# Patient Record
Sex: FEMALE | Race: BLACK OR AFRICAN AMERICAN | NOT HISPANIC OR LATINO | Employment: UNEMPLOYED | URBAN - METROPOLITAN AREA
[De-identification: names, ages, dates, MRNs, and addresses within clinical notes are randomized per-mention and may not be internally consistent; named-entity substitution may affect disease eponyms.]

---

## 2021-01-15 ENCOUNTER — HISTORICAL (OUTPATIENT)
Dept: ADMINISTRATIVE | Facility: HOSPITAL | Age: 31
End: 2021-01-15

## 2021-01-15 LAB
ABS NEUT (OLG): 2.18 X10(3)/MCL (ref 2.1–9.2)
ALBUMIN SERPL-MCNC: 4 GM/DL (ref 3.5–5)
ALBUMIN/GLOB SERPL: 0.9 RATIO (ref 1.1–2)
ALP SERPL-CCNC: 69 UNIT/L (ref 40–150)
ALT SERPL-CCNC: 14 UNIT/L (ref 0–55)
AST SERPL-CCNC: 21 UNIT/L (ref 5–34)
BASOPHILS # BLD AUTO: 0.1 X10(3)/MCL (ref 0–0.2)
BASOPHILS NFR BLD AUTO: 1 %
BILIRUB SERPL-MCNC: 0.2 MG/DL
BILIRUBIN DIRECT+TOT PNL SERPL-MCNC: 0.1 MG/DL (ref 0–0.5)
BILIRUBIN DIRECT+TOT PNL SERPL-MCNC: 0.1 MG/DL (ref 0–0.8)
BUN SERPL-MCNC: 8 MG/DL (ref 7–18.7)
CALCIUM SERPL-MCNC: 9 MG/DL (ref 8.4–10.2)
CHLORIDE SERPL-SCNC: 107 MMOL/L (ref 98–107)
CHOLEST SERPL-MCNC: 160 MG/DL
CHOLEST/HDLC SERPL: 3 {RATIO} (ref 0–5)
CO2 SERPL-SCNC: 25 MMOL/L (ref 22–29)
CREAT SERPL-MCNC: 0.83 MG/DL (ref 0.55–1.02)
EOSINOPHIL # BLD AUTO: 0.1 X10(3)/MCL (ref 0–0.9)
EOSINOPHIL NFR BLD AUTO: 1 %
ERYTHROCYTE [DISTWIDTH] IN BLOOD BY AUTOMATED COUNT: 18.7 % (ref 11.5–14.5)
EST. AVERAGE GLUCOSE BLD GHB EST-MCNC: 119.8 MG/DL
GLOBULIN SER-MCNC: 4.4 GM/DL (ref 2.4–3.5)
GLUCOSE SERPL-MCNC: 83 MG/DL (ref 74–100)
HBA1C MFR BLD: 5.8 %
HCT VFR BLD AUTO: 33.9 % (ref 35–46)
HDLC SERPL-MCNC: 62 MG/DL (ref 35–60)
HGB BLD-MCNC: 9.8 GM/DL (ref 12–16)
HYPOCHROMIA BLD QL SMEAR: NORMAL
IMM GRANULOCYTES # BLD AUTO: 0.01 10*3/UL
IMM GRANULOCYTES NFR BLD AUTO: 0 %
LDLC SERPL CALC-MCNC: 84 MG/DL (ref 50–140)
LYMPHOCYTES # BLD AUTO: 2.5 X10(3)/MCL (ref 0.6–4.6)
LYMPHOCYTES NFR BLD AUTO: 48 %
MCH RBC QN AUTO: 19.2 PG (ref 26–34)
MCHC RBC AUTO-ENTMCNC: 28.9 GM/DL (ref 31–37)
MCV RBC AUTO: 66.5 FL (ref 80–100)
MONOCYTES # BLD AUTO: 0.3 X10(3)/MCL (ref 0.1–1.3)
MONOCYTES NFR BLD AUTO: 7 %
NEUTROPHILS # BLD AUTO: 2.18 X10(3)/MCL (ref 2.1–9.2)
NEUTROPHILS NFR BLD AUTO: 42 %
PLATELET # BLD AUTO: 418 X10(3)/MCL (ref 130–400)
PLATELET # BLD EST: ADEQUATE 10*3/UL
PMV BLD AUTO: 10.1 FL (ref 7.4–10.4)
POTASSIUM SERPL-SCNC: 3.9 MMOL/L (ref 3.5–5.1)
PROT SERPL-MCNC: 8.4 GM/DL (ref 6.4–8.3)
RBC # BLD AUTO: 5.1 X10(6)/MCL (ref 4–5.2)
RBC MORPH BLD: NORMAL
SODIUM SERPL-SCNC: 141 MMOL/L (ref 136–145)
TRIGL SERPL-MCNC: 72 MG/DL (ref 37–140)
TSH SERPL-ACNC: 1.06 UIU/ML (ref 0.35–4.94)
VLDLC SERPL CALC-MCNC: 14 MG/DL
WBC # SPEC AUTO: 5.1 X10(3)/MCL (ref 4.5–11)

## 2021-11-30 ENCOUNTER — HISTORICAL (OUTPATIENT)
Dept: ADMINISTRATIVE | Facility: HOSPITAL | Age: 31
End: 2021-11-30

## 2021-11-30 LAB
BILIRUB SERPL-MCNC: NEGATIVE MG/DL
BLOOD URINE, POC: NEGATIVE
CLARITY, POC UA: NORMAL
COLOR, POC UA: YELLOW
GLUCOSE UR QL STRIP: NEGATIVE
KETONES UR QL STRIP: NEGATIVE
LEUKOCYTE EST, POC UA: NORMAL
NITRITE, POC UA: NEGATIVE
PH, POC UA: 7.5
POC BETA-HCG (QUAL): NEGATIVE
PROTEIN, POC: NEGATIVE
SPECIFIC GRAVITY, POC UA: 1.02
UROBILINOGEN, POC UA: NORMAL

## 2021-12-02 LAB — FINAL CULTURE: NORMAL

## 2022-04-11 ENCOUNTER — HISTORICAL (OUTPATIENT)
Dept: ADMINISTRATIVE | Facility: HOSPITAL | Age: 32
End: 2022-04-11
Payer: MEDICAID

## 2022-04-29 VITALS
OXYGEN SATURATION: 100 % | SYSTOLIC BLOOD PRESSURE: 101 MMHG | DIASTOLIC BLOOD PRESSURE: 70 MMHG | WEIGHT: 184.94 LBS | HEIGHT: 66 IN | WEIGHT: 177.94 LBS | SYSTOLIC BLOOD PRESSURE: 113 MMHG | BODY MASS INDEX: 28.6 KG/M2 | OXYGEN SATURATION: 100 % | BODY MASS INDEX: 29.72 KG/M2 | HEIGHT: 66 IN | DIASTOLIC BLOOD PRESSURE: 79 MMHG

## 2022-05-05 NOTE — HISTORICAL [facility] CERNER
This is a historical note converted from [employer]. Formatting and pictures may have been removed.  Please reference [employer] for original formatting and attached multimedia. Chief Complaint  burning with urination and wants to check for yeast infection  History of Present Illness  burning with urination and wants to check for yeast infection.  [facility] has seen Dr. Isha Freire in the past.  [facility] she gets frequent vaginal infections such as yeast or BV.  always after her cycle.   has never taken oral medicine for BV, was given a cream.  took OTC boric acid vaginal suppositories.  not diabetic,  was tested last year.  Review of Systems  Constitutional:?negative except as stated in HPI  Eye:?negative except as stated in HPI  ENMT:?negative except as stated in HPI  Respiratory:?negative except as stated in HPI  Cardiovascular:?negative except as stated in HPI  Gastrointestinal:?negative except as stated in HPI  Genitourinary:?negative except as stated in HPI  Hema/Lymph:?negative except as stated in HPI  Endocrine:?negative except as stated in HPI  Musculoskeletal:?negative except as stated in HPI  Integumentary:?negative except as stated in HPI  Neurologic: negative except as stated in HPI  Physical Exam  Vitals & Measurements  T:?37.2? ?C (Oral)? HR:?91(Peripheral)? RR:?16? BP:?101/70? SpO2:?100%?  HT:?168.00?cm? WT:?83.900?kg? BMI:?29.73? LMP:?11/11/2021 00:00 CST?  Chaperone present.?Maine LEYVAN  ?  Constitutional:? well today.  Psychiatric: ?Orientated to time, place, person, and situation. Normal mood and affect. Ambulatory, alert.?tolerated pelvic/speculum exam well.  Female Genitalia:  ?? Vulva: no masses, atrophy or lesions. superficial maceration; gritty, white to yellow discharge in the skin folds.  ?? Bladder/Urethra: no urethral discharge or visualized?mass, normal appearing meatus, bladder non-distended.  ?? Vagina:+vaginal discharge,?white,?moderatecottage cheese consistency; +tenderness,  erythema, no vesicle(s) or ulcers;?no odor.?no foreign objects.?????????????  ?? Cervix:?no discharge or cervical motion tenderness.  ?? Adnexa:?bimanual exam deferred  ?  ?  Assessment/Plan  1.?Cloudy urine?R82.90  ?urine dip appears to be contamination, no concern for UTI today.  culture pending due to symptoms.  Ordered:  Office/Outpatient Visit Level 4 Established [id number] PC, Vaginal discharge  Cloudy urine, 11/30/21 19:39:00 CST  Urine Culture [id number], Routine collect, 11/30/21 19:21:00 CST, Urine, Nurse collect, Stop date 11/30/21 19:21:00 CST, Cloudy urine  ?  2.?Vaginal yeast infection?B37.3  ?treat with diflucan.  discussed decrease or eliminate sugar in diet. drink more water. no tub baths/bubble baths. no scented pads/tampons. no douching. make PCP appt to be retested for DM. start a vaginal pH probiotic OTC. always cotton underwear. if sweating due to exercise, work etc, change out of wet clothes immediately at the end of activity.  Ordered:  nystatin topical, 1 ralph, TOP, TID, external use only, X 14 day(s), # 30 gm, 1 Refill(s), Pharmacy: [facility]/pharmacy #[id number], 168, cm, Height/Length Dosing, 11/30/21 18:31:00 CST, 83.9, kg, Weight Dosing, 11/30/21 18:31:00 CST  ?  3.?Vaginal discharge?N89.8,?Vaginal discharge?N89.8  ?discussed females more prone to BV after menstrual cycles, but if patient has never taken metronidazole, she would need that if +BV on swab today. start a vaginal pH probiotic.  Ordered:  Office/Outpatient Visit Level 4 Established [id number] PC, Vaginal discharge  Cloudy urine, 11/30/21 19:39:00 CST  Wet Prep Smear, Stat collect, Vaginal, Collected, 11/30/21 19:36:00 CST, Stop date 11/30/21 19:36:00 CST, Nurse collect, Vaginal discharge, 11/30/21 19:36:00 CST  ?   Problem List/Past Medical History  Ongoing  No chronic problems  Historical  No qualifying data  Procedure/Surgical History  Ultrasound, transvaginal (03/04/2021)  denies   Medications  Colace 100 mg oral capsule, 100 mg= 1 cap(s), Oral,  BID, PRN, 5 refills,? ?Not Taking, Completed Rx  nystatin 100,000 units/g topical cream, 1 ralph, TOP, TID, 1 refills  Allergies  No Known Allergies  Social History  Abuse/Neglect  No, No, Yes, 11/30/2021  Alcohol - Low Risk, 06/22/2014  Current, 1-2 times per month, 01/15/2021  Employment/School  Unemployed, 01/15/2021  Exercise  Home/Environment  Lives with Alone., 01/15/2021  Nutrition/Health  Regular, 01/15/2021  Sexual  Sexually active: Yes. Gender Identity Identifies as female., 01/15/2021  Substance Use  Never, 01/15/2021  Tobacco - Denies Tobacco Use, 06/22/2014  Never (less than 100 in lifetime), N/A, 11/30/2021  Immunizations  Vaccine Date Status   COVID-19, vector nr, [id number] - Tashi 07/07/2021 Given   tetanus-diphtheria toxoids 05/20/2021 Given   Health Maintenance  Health Maintenance  ???Pending?(in the next year)  ??? ??OverDue  ??? ? ? ?Alcohol Misuse Screening due??01/02/21??and every 1??year(s)  ??? ??Due?  ??? ? ? ?Cervical Cancer Screening due??11/30/21??Unknown Frequency  ??? ??Due In Future?  ??? ? ? ?Obesity Screening not due until??01/01/22??and every 1??year(s)  ??? ? ? ?Depression Screening not due until??01/15/22??and every 1??year(s)  ??? ? ? ?Diabetes Screening not due until??01/15/22??and every 1??year(s)  ??? ? ? ?ADL Screening not due until??01/15/22??and every 1??year(s)  ???Satisfied?(in the past 1 year)  ??? ??Satisfied?  ??? ? ? ?ADL Screening on??01/15/21.??Satisfied by Gladys Chappell  ??? ? ? ?Blood Pressure Screening on??11/30/21.??Satisfied by Maine Valerio LPN  ??? ? ? ?Body Mass Index Check on??11/30/21.??Satisfied by Maine Valerio LPN  ??? ? ? ?Depression Screening on??01/15/21.??Satisfied by Gladys Chappell  ??? ? ? ?Diabetes Screening on??01/15/21.??Satisfied by Za Peralta  ??? ? ? ?Influenza Vaccine on??11/30/21.??Satisfied by Maine Valerio LPN  ??? ? ? ?Obesity Screening on??11/30/21.??Satisfied by Maine Valerio LPN  ??? ? ?  ?Tetanus Vaccine on??05/20/21.??Satisfied by David Kumar LPN  ?  Lab Results  Test Name Test Result Date/Time   Urine Color Urine Dipstick Yellow 11/30/2021 18:21 CST   Urine Appearance Urine Dipstick Cloudy 11/30/2021 18:21 CST   pH Urine Dipstick 7.5 11/30/2021 18:21 CST   Specific Gravity Urine Dipstick 1.020 11/30/2021 18:21 CST   Blood Urine Dipstick Negative 11/30/2021 18:21 CST   Glucose Urine Dipstick Negative 11/30/2021 18:21 CST   Ketones Urine Dipstick Negative 11/30/2021 18:21 CST   Protein Urine Dipstick Negative 11/30/2021 18:21 CST   Bilirubin Urine Dipstick Negative 11/30/2021 18:21 CST   Urobilinogen Urine Dipstick 0.2 mg/dl 11/30/2021 18:21 CST   Leukocytes Urine Dipstick Trace 11/30/2021 18:21 CST   Nitrite Urine Dipstick Negative 11/30/2021 18:21 CST   U beta hCG Ql POC Negative 11/30/2021 18:39 CST   Diagnostic Results  pending

## 2022-07-28 ENCOUNTER — OFFICE VISIT (OUTPATIENT)
Dept: INTERNAL MEDICINE | Facility: CLINIC | Age: 32
End: 2022-07-28
Payer: MEDICAID

## 2022-07-28 ENCOUNTER — PATIENT MESSAGE (OUTPATIENT)
Dept: INTERNAL MEDICINE | Facility: CLINIC | Age: 32
End: 2022-07-28

## 2022-07-28 VITALS
SYSTOLIC BLOOD PRESSURE: 100 MMHG | DIASTOLIC BLOOD PRESSURE: 70 MMHG | OXYGEN SATURATION: 100 % | WEIGHT: 176 LBS | HEART RATE: 95 BPM | HEIGHT: 66 IN | TEMPERATURE: 98 F | BODY MASS INDEX: 28.28 KG/M2 | RESPIRATION RATE: 18 BRPM

## 2022-07-28 DIAGNOSIS — K59.00 CONSTIPATION, UNSPECIFIED CONSTIPATION TYPE: ICD-10-CM

## 2022-07-28 DIAGNOSIS — Z13.21 ENCOUNTER FOR VITAMIN DEFICIENCY SCREENING: ICD-10-CM

## 2022-07-28 DIAGNOSIS — Z13.0 SCREENING FOR IRON DEFICIENCY ANEMIA: ICD-10-CM

## 2022-07-28 DIAGNOSIS — Z11.59 SCREENING FOR VIRAL DISEASE: ICD-10-CM

## 2022-07-28 DIAGNOSIS — Z11.3 ROUTINE SCREENING FOR STI (SEXUALLY TRANSMITTED INFECTION): ICD-10-CM

## 2022-07-28 DIAGNOSIS — Z13.220 SCREENING FOR LIPID DISORDERS: ICD-10-CM

## 2022-07-28 DIAGNOSIS — Z11.4 SCREENING FOR HIV (HUMAN IMMUNODEFICIENCY VIRUS): ICD-10-CM

## 2022-07-28 DIAGNOSIS — G43.909 MIGRAINE WITHOUT STATUS MIGRAINOSUS, NOT INTRACTABLE, UNSPECIFIED MIGRAINE TYPE: ICD-10-CM

## 2022-07-28 DIAGNOSIS — Z13.29 SCREENING FOR THYROID DISORDER: ICD-10-CM

## 2022-07-28 DIAGNOSIS — Z13.1 SCREENING FOR DIABETES MELLITUS: ICD-10-CM

## 2022-07-28 DIAGNOSIS — N97.9 INFERTILITY, FEMALE: Primary | ICD-10-CM

## 2022-07-28 PROCEDURE — 1159F PR MEDICATION LIST DOCUMENTED IN MEDICAL RECORD: ICD-10-PCS | Mod: CPTII,,,

## 2022-07-28 PROCEDURE — 3078F PR MOST RECENT DIASTOLIC BLOOD PRESSURE < 80 MM HG: ICD-10-PCS | Mod: CPTII,,,

## 2022-07-28 PROCEDURE — 1160F PR REVIEW ALL MEDS BY PRESCRIBER/CLIN PHARMACIST DOCUMENTED: ICD-10-PCS | Mod: CPTII,,,

## 2022-07-28 PROCEDURE — 3008F BODY MASS INDEX DOCD: CPT | Mod: CPTII,,,

## 2022-07-28 PROCEDURE — 99214 PR OFFICE/OUTPT VISIT, EST, LEVL IV, 30-39 MIN: ICD-10-PCS | Mod: S$PBB,,,

## 2022-07-28 PROCEDURE — 3008F PR BODY MASS INDEX (BMI) DOCUMENTED: ICD-10-PCS | Mod: CPTII,,,

## 2022-07-28 PROCEDURE — 99214 OFFICE O/P EST MOD 30 MIN: CPT | Mod: S$PBB,,,

## 2022-07-28 PROCEDURE — 3074F PR MOST RECENT SYSTOLIC BLOOD PRESSURE < 130 MM HG: ICD-10-PCS | Mod: CPTII,,,

## 2022-07-28 PROCEDURE — 3078F DIAST BP <80 MM HG: CPT | Mod: CPTII,,,

## 2022-07-28 PROCEDURE — 1160F RVW MEDS BY RX/DR IN RCRD: CPT | Mod: CPTII,,,

## 2022-07-28 PROCEDURE — 3074F SYST BP LT 130 MM HG: CPT | Mod: CPTII,,,

## 2022-07-28 PROCEDURE — 99215 OFFICE O/P EST HI 40 MIN: CPT | Mod: PBBFAC

## 2022-07-28 PROCEDURE — 1159F MED LIST DOCD IN RCRD: CPT | Mod: CPTII,,,

## 2022-07-28 RX ORDER — SUMATRIPTAN 50 MG/1
50 TABLET, FILM COATED ORAL EVERY 6 HOURS PRN
Qty: 30 TABLET | Refills: 1 | Status: SHIPPED | OUTPATIENT
Start: 2022-07-28 | End: 2023-01-18

## 2022-07-28 NOTE — ASSESSMENT & PLAN NOTE
Rx sumatriptan.  Avoid triggers such as bright lights, alcohol, nicotine, aged cheeses, chocolate, caffeine, foods/drinks that contain nitrates or aspartame.   Take meds as prescribed.   Lie in a quiet, dark room if possible.   Limit stress and get at least 8 hours of sleep per night.

## 2022-07-28 NOTE — ASSESSMENT & PLAN NOTE
Referral to [facility] GYN clinic.  LH, FH, and testosterone ordered, will refer to Endocrine if any abnormalities.

## 2022-07-28 NOTE — PROGRESS NOTES
Subjective:       Patient ID: Dafne Traore is a 32 y.o. female.    Chief Complaint: Establish Care, Constipation, Headache, and Infertility    HPI   Dafne Traore is a 32 y.o. Black female presenting in clinic today to Establish Care, Constipation, Headache, and Infertility. Previous PCP: Dr. Isha Freire. No significant medical history.     Patient states she is trying to conceive and has been unsuccessful. She says she does not have any children, but her spouse has children. She says her menstrual cycle is monthly, but it does not come on the same date each month as she used to. She thinks she may have PCOS. She says she does have hair that grows on her chin.     She is also complaining of constipation. She experiences intermittent LLQ pain. She says he will have a bowel movement once a week. Last bowel movement last night. She endorses flatus. She denies blood in stool or dark, tarry stool. She has tried miralax previously.     She is complaining of a left frontal HA radiates to back of head. Quality is throbbing. She says it occurs approximately ;associated L hemiplegia. She says it occurs daily, but then sometimes 3xs per week that lasts a few hours when it occurs. HA associated with photophobia and phonophobia and at times nausea. Declines an aura. She takes tylenol prn without relief.    Denies smoking, drinking, or illicit drug use. Denies chest pain, shortness of breath, cough, headache, dizziness, weakness, abdominal pain, nausea, vomiting, diarrhea, constipation, dysuria, depression, anxiety, SI, and HI.      Cervical Cancer Screening - Last Pap on 2021 at [facility]. Follow up with GYN Clinic for annual Pap/Pelvic.  Breast Cancer Screening -Deferred due to age.   Colon Cancer Screening - Deferred due to age.   Osteoporosis Screening -Deferred due to age.   Vaccinations: Flu - Not currently being offered. / Tetanus - 5/20/2021 /COVID - 7/7/2021 ([facility]).         Review of Systems   Constitutional:  Negative.    HENT: Negative.    Eyes: Negative.    Respiratory: Negative.    Cardiovascular: Negative.    Gastrointestinal: Positive for constipation.   Endocrine: Negative.    Genitourinary: Positive for menstrual problem.   Musculoskeletal: Negative.    Integumentary:  Negative.   Allergic/Immunologic: Negative.    Neurological: Positive for headaches.   Hematological: Negative.    Psychiatric/Behavioral: Negative.    All other systems reviewed and are negative.        Objective:      Physical Exam  Constitutional:       Appearance: Normal appearance. She is normal weight.   HENT:      Head: Normocephalic and atraumatic.      Right Ear: External ear normal.      Left Ear: External ear normal.      Nose: Nose normal.      Mouth/Throat:      Mouth: Mucous membranes are moist.      Pharynx: Oropharynx is clear.   Eyes:      Extraocular Movements: Extraocular movements intact.      Conjunctiva/sclera: Conjunctivae normal.      Pupils: Pupils are equal, round, and reactive to light.   Cardiovascular:      Rate and Rhythm: Normal rate and regular rhythm.      Pulses: Normal pulses.      Heart sounds: Normal heart sounds.   Pulmonary:      Effort: Pulmonary effort is normal.      Breath sounds: Normal breath sounds.   Abdominal:      General: Bowel sounds are normal.      Palpations: Abdomen is soft.   Musculoskeletal:         General: Normal range of motion.      Cervical back: Normal range of motion and neck supple.   Skin:     General: Skin is warm and dry.      Capillary Refill: Capillary refill takes less than 2 seconds.   Neurological:      General: No focal deficit present.      Mental Status: She is alert and oriented to person, place, and time.   Psychiatric:         Mood and Affect: Mood normal.         Behavior: Behavior normal.         Thought Content: Thought content normal.         Judgment: Judgment normal.         Assessment and Plan:       Problem List Items Addressed This Visit        Neuro    Migraine  without status migrainosus, not intractable     Rx sumatriptan.  Avoid triggers such as bright lights, alcohol, nicotine, aged cheeses, chocolate, caffeine, foods/drinks that contain nitrates or aspartame.   Take meds as prescribed.   Lie in a quiet, dark room if possible.   Limit stress and get at least 8 hours of sleep per night.              Relevant Medications    sumatriptan (IMITREX) 50 MG tablet       Renal/    Infertility, female - Primary     Referral to [facility] GYN clinic.  LH, FH, and testosterone ordered, will refer to Endocrine if any abnormalities.           Relevant Orders    Ambulatory referral/consult to Gynecology    CBC Auto Differential    Comprehensive Metabolic Panel    Microalbumin/Creatinine Ratio, Urine    Urinalysis, Reflex to Urine Culture Urine, Clean Catch    Testosterone    Follicle Stimulating Hormone    Luteinizing Hormone       Endocrine    BMI 28.0-28.9,adult     Body mass index is 28.41 kg/m². (At goal).                GI    Constipation     Rx linzess.  Educated on high fiber diet and exercise.  Drink at least 64 ozs of water daily.  Eat hot breakfast q am.             Relevant Medications    linaCLOtide (LINZESS) 72 mcg Cap capsule      Other Visit Diagnoses     Screening for thyroid disorder        Relevant Orders    T4, Free    TSH    Encounter for vitamin deficiency screening        Relevant Orders    Vitamin D    Routine screening for STI (sexually transmitted infection)        Relevant Orders    Chlamydia/GC, PCR    SYPHILIS ANTIBODY (WITH REFLEX RPR)    Screening for diabetes mellitus        Relevant Orders    Hemoglobin A1C    Screening for iron deficiency anemia        Relevant Orders    Ferritin    Iron and TIBC    Screening for lipid disorders        Relevant Orders    Lipid Panel    Screening for viral disease        Relevant Orders    Hepatitis Panel, Acute    Screening for HIV (human immunodeficiency virus)        Relevant Orders    HIV 1/2 Ag/Ab (4th Gen)             Audio only visit in one week.  Labs prior to appt.  RTC prn.      Daniella Lopez, JAH  07/28/2022

## 2022-07-28 NOTE — ASSESSMENT & PLAN NOTE
Rx linzess.  Educated on high fiber diet and exercise.  Drink at least 64 ozs of water daily.  Eat hot breakfast q am.

## 2022-07-29 ENCOUNTER — APPOINTMENT (OUTPATIENT)
Dept: LAB | Facility: HOSPITAL | Age: 32
End: 2022-07-29
Payer: MEDICAID

## 2022-07-29 DIAGNOSIS — Z13.220 SCREENING FOR LIPID DISORDERS: ICD-10-CM

## 2022-07-29 DIAGNOSIS — Z13.21 ENCOUNTER FOR VITAMIN DEFICIENCY SCREENING: ICD-10-CM

## 2022-07-29 DIAGNOSIS — Z13.0 SCREENING FOR IRON DEFICIENCY ANEMIA: ICD-10-CM

## 2022-07-29 DIAGNOSIS — N97.9 INFERTILITY, FEMALE: ICD-10-CM

## 2022-07-29 DIAGNOSIS — Z11.4 SCREENING FOR HIV (HUMAN IMMUNODEFICIENCY VIRUS): ICD-10-CM

## 2022-07-29 DIAGNOSIS — Z11.59 SCREENING FOR VIRAL DISEASE: ICD-10-CM

## 2022-07-29 DIAGNOSIS — Z13.29 SCREENING FOR THYROID DISORDER: ICD-10-CM

## 2022-07-29 DIAGNOSIS — Z13.1 SCREENING FOR DIABETES MELLITUS: ICD-10-CM

## 2022-07-29 DIAGNOSIS — Z11.3 ROUTINE SCREENING FOR STI (SEXUALLY TRANSMITTED INFECTION): ICD-10-CM

## 2022-07-29 LAB
ALBUMIN SERPL-MCNC: 4 GM/DL (ref 3.5–5)
ALBUMIN/GLOB SERPL: 0.9 RATIO (ref 1.1–2)
ALP SERPL-CCNC: 68 UNIT/L (ref 40–150)
ALT SERPL-CCNC: 23 UNIT/L (ref 0–55)
APPEARANCE UR: CLEAR
AST SERPL-CCNC: 23 UNIT/L (ref 5–34)
BACTERIA #/AREA URNS AUTO: ABNORMAL /HPF
BASOPHILS # BLD AUTO: 0.06 X10(3)/MCL (ref 0–0.2)
BASOPHILS NFR BLD AUTO: 1.3 %
BILIRUB UR QL STRIP.AUTO: NEGATIVE MG/DL
BILIRUBIN DIRECT+TOT PNL SERPL-MCNC: 0.3 MG/DL
BUN SERPL-MCNC: 8.3 MG/DL (ref 7–18.7)
C TRACH DNA SPEC QL NAA+PROBE: NOT DETECTED
CALCIUM SERPL-MCNC: 9.5 MG/DL (ref 8.4–10.2)
CHLORIDE SERPL-SCNC: 105 MMOL/L (ref 98–107)
CHOLEST SERPL-MCNC: 175 MG/DL
CHOLEST/HDLC SERPL: 2 {RATIO} (ref 0–5)
CO2 SERPL-SCNC: 26 MMOL/L (ref 22–29)
COLOR UR AUTO: ABNORMAL
CREAT SERPL-MCNC: 0.8 MG/DL (ref 0.55–1.02)
CREAT UR-MCNC: 192.2 MG/DL (ref 47–110)
DEPRECATED CALCIDIOL+CALCIFEROL SERPL-MC: 21.8 NG/ML (ref 30–80)
EOSINOPHIL # BLD AUTO: 0.05 X10(3)/MCL (ref 0–0.9)
EOSINOPHIL NFR BLD AUTO: 1.1 %
ERYTHROCYTE [DISTWIDTH] IN BLOOD BY AUTOMATED COUNT: 18.6 % (ref 11.5–17)
EST. AVERAGE GLUCOSE BLD GHB EST-MCNC: 114 MG/DL
FERRITIN SERPL-MCNC: 6.71 NG/ML (ref 4.63–204)
FSH SERPL-ACNC: 7.72 MIU/ML
GLOBULIN SER-MCNC: 4.5 GM/DL (ref 2.4–3.5)
GLUCOSE SERPL-MCNC: 88 MG/DL (ref 74–100)
GLUCOSE UR QL STRIP.AUTO: NORMAL MG/DL
HAV IGM SERPL QL IA: NONREACTIVE
HBA1C MFR BLD: 5.6 %
HBV CORE IGM SERPL QL IA: NONREACTIVE
HBV SURFACE AG SERPL QL IA: NONREACTIVE
HCT VFR BLD AUTO: 35.7 % (ref 37–47)
HCV AB SERPL QL IA: NONREACTIVE
HDLC SERPL-MCNC: 75 MG/DL (ref 35–60)
HGB BLD-MCNC: 10.6 GM/DL (ref 12–16)
HIV 1+2 AB+HIV1 P24 AG SERPL QL IA: NONREACTIVE
HYALINE CASTS #/AREA URNS LPF: ABNORMAL /LPF
IMM GRANULOCYTES # BLD AUTO: 0.01 X10(3)/MCL (ref 0–0.04)
IMM GRANULOCYTES NFR BLD AUTO: 0.2 %
IRON SATN MFR SERPL: 5 % (ref 20–50)
IRON SERPL-MCNC: 22 UG/DL (ref 50–170)
KETONES UR QL STRIP.AUTO: NEGATIVE MG/DL
LDLC SERPL CALC-MCNC: 91 MG/DL (ref 50–140)
LEUKOCYTE ESTERASE UR QL STRIP.AUTO: NEGATIVE UNIT/L
LH SERPL-ACNC: 6.5 MIU/ML
LYMPHOCYTES # BLD AUTO: 1.99 X10(3)/MCL (ref 0.6–4.6)
LYMPHOCYTES NFR BLD AUTO: 42.3 %
MCH RBC QN AUTO: 20 PG (ref 27–31)
MCHC RBC AUTO-ENTMCNC: 29.7 MG/DL (ref 33–36)
MCV RBC AUTO: 67.5 FL (ref 80–94)
MICROALBUMIN UR-MCNC: 16.2 UG/ML
MICROALBUMIN/CREAT RATIO PNL UR: 8.4 MG/GM CR (ref 0–30)
MONOCYTES # BLD AUTO: 0.28 X10(3)/MCL (ref 0.1–1.3)
MONOCYTES NFR BLD AUTO: 5.9 %
MUCOUS THREADS URNS QL MICRO: ABNORMAL /LPF
N GONORRHOEA DNA SPEC QL NAA+PROBE: NOT DETECTED
NEUTROPHILS # BLD AUTO: 2.3 X10(3)/MCL (ref 2.1–9.2)
NEUTROPHILS NFR BLD AUTO: 49.2 %
NITRITE UR QL STRIP.AUTO: NEGATIVE
NRBC BLD AUTO-RTO: 0 %
PH UR STRIP.AUTO: 5.5 [PH]
PLATELET # BLD AUTO: 380 X10(3)/MCL (ref 130–400)
PMV BLD AUTO: 9.7 FL (ref 7.4–10.4)
POTASSIUM SERPL-SCNC: 3.9 MMOL/L (ref 3.5–5.1)
PROT SERPL-MCNC: 8.5 GM/DL (ref 6.4–8.3)
PROT UR QL STRIP.AUTO: NEGATIVE MG/DL
RBC # BLD AUTO: 5.29 X10(6)/MCL (ref 4.2–5.4)
RBC #/AREA URNS AUTO: ABNORMAL /HPF
RBC UR QL AUTO: NEGATIVE UNIT/L
SODIUM SERPL-SCNC: 139 MMOL/L (ref 136–145)
SP GR UR STRIP.AUTO: 1.02
SQUAMOUS #/AREA URNS LPF: ABNORMAL /HPF
T PALLIDUM AB SER QL: REACTIVE
T4 FREE SERPL-MCNC: 0.88 NG/DL (ref 0.7–1.48)
TESTOST SERPL-MCNC: 34.31 NG/DL (ref 13.84–53.35)
TIBC SERPL-MCNC: 400 UG/DL (ref 70–310)
TIBC SERPL-MCNC: 422 UG/DL (ref 250–450)
TRANSFERRIN SERPL-MCNC: 385 MG/DL (ref 180–382)
TRIGL SERPL-MCNC: 43 MG/DL (ref 37–140)
TSH SERPL-ACNC: 0.78 UIU/ML (ref 0.35–4.94)
UROBILINOGEN UR STRIP-ACNC: NORMAL MG/DL
VLDLC SERPL CALC-MCNC: 9 MG/DL
WBC # SPEC AUTO: 4.7 X10(3)/MCL (ref 4.5–11.5)
WBC #/AREA URNS AUTO: ABNORMAL /HPF

## 2022-07-29 PROCEDURE — 83540 ASSAY OF IRON: CPT

## 2022-07-29 PROCEDURE — 86780 TREPONEMA PALLIDUM: CPT

## 2022-07-29 PROCEDURE — 80061 LIPID PANEL: CPT

## 2022-07-29 PROCEDURE — 84443 ASSAY THYROID STIM HORMONE: CPT

## 2022-07-29 PROCEDURE — 84403 ASSAY OF TOTAL TESTOSTERONE: CPT

## 2022-07-29 PROCEDURE — 83036 HEMOGLOBIN GLYCOSYLATED A1C: CPT

## 2022-07-29 PROCEDURE — 82306 VITAMIN D 25 HYDROXY: CPT

## 2022-07-29 PROCEDURE — 80074 ACUTE HEPATITIS PANEL: CPT

## 2022-07-29 PROCEDURE — 87491 CHLMYD TRACH DNA AMP PROBE: CPT

## 2022-07-29 PROCEDURE — 85025 COMPLETE CBC W/AUTO DIFF WBC: CPT

## 2022-07-29 PROCEDURE — 87389 HIV-1 AG W/HIV-1&-2 AB AG IA: CPT

## 2022-07-29 PROCEDURE — 82728 ASSAY OF FERRITIN: CPT

## 2022-07-29 PROCEDURE — 87591 N.GONORRHOEAE DNA AMP PROB: CPT

## 2022-07-29 PROCEDURE — 80053 COMPREHEN METABOLIC PANEL: CPT

## 2022-07-29 PROCEDURE — 82043 UR ALBUMIN QUANTITATIVE: CPT

## 2022-07-29 PROCEDURE — 86592 SYPHILIS TEST NON-TREP QUAL: CPT

## 2022-07-29 PROCEDURE — 83002 ASSAY OF GONADOTROPIN (LH): CPT

## 2022-07-29 PROCEDURE — 83001 ASSAY OF GONADOTROPIN (FSH): CPT

## 2022-07-29 PROCEDURE — 81001 URINALYSIS AUTO W/SCOPE: CPT

## 2022-07-29 PROCEDURE — 36415 COLL VENOUS BLD VENIPUNCTURE: CPT

## 2022-07-29 PROCEDURE — 84439 ASSAY OF FREE THYROXINE: CPT

## 2022-07-31 LAB
RPR SER QL: NORMAL
RPR SER QL: NORMAL
RPR SER-TITR: NORMAL {TITER}

## 2022-08-03 LAB — T PALLIDUM AB SER QL: REACTIVE

## 2022-08-04 ENCOUNTER — OFFICE VISIT (OUTPATIENT)
Dept: INTERNAL MEDICINE | Facility: CLINIC | Age: 32
End: 2022-08-04
Payer: MEDICAID

## 2022-08-04 DIAGNOSIS — D50.8 IRON DEFICIENCY ANEMIA SECONDARY TO INADEQUATE DIETARY IRON INTAKE: Primary | ICD-10-CM

## 2022-08-04 DIAGNOSIS — Z00.00 WELL ADULT EXAM: ICD-10-CM

## 2022-08-04 DIAGNOSIS — E55.9 VITAMIN D DEFICIENCY: ICD-10-CM

## 2022-08-04 DIAGNOSIS — L70.8 OTHER ACNE: ICD-10-CM

## 2022-08-04 DIAGNOSIS — L68.0 FEMALE HIRSUTISM: ICD-10-CM

## 2022-08-04 PROCEDURE — 99214 OFFICE O/P EST MOD 30 MIN: CPT | Mod: 95,,,

## 2022-08-04 PROCEDURE — 1160F RVW MEDS BY RX/DR IN RCRD: CPT | Mod: CPTII,95,,

## 2022-08-04 PROCEDURE — 1159F PR MEDICATION LIST DOCUMENTED IN MEDICAL RECORD: ICD-10-PCS | Mod: CPTII,95,,

## 2022-08-04 PROCEDURE — 99214 PR OFFICE/OUTPT VISIT, EST, LEVL IV, 30-39 MIN: ICD-10-PCS | Mod: 95,,,

## 2022-08-04 PROCEDURE — 1159F MED LIST DOCD IN RCRD: CPT | Mod: CPTII,95,,

## 2022-08-04 PROCEDURE — 1160F PR REVIEW ALL MEDS BY PRESCRIBER/CLIN PHARMACIST DOCUMENTED: ICD-10-PCS | Mod: CPTII,95,,

## 2022-08-04 RX ORDER — FERROUS SULFATE 325(65) MG
325 TABLET, DELAYED RELEASE (ENTERIC COATED) ORAL DAILY
Qty: 90 TABLET | Refills: 1 | Status: SHIPPED | OUTPATIENT
Start: 2022-08-04 | End: 2023-01-18

## 2022-08-04 RX ORDER — ASPIRIN 325 MG
50000 TABLET, DELAYED RELEASE (ENTERIC COATED) ORAL
Qty: 12 CAPSULE | Refills: 0 | Status: SHIPPED | OUTPATIENT
Start: 2022-08-04 | End: 2023-04-20

## 2022-08-04 NOTE — PROGRESS NOTES
PATIENT NAME: Dafne Traore  : 1990  DATE: 22  MRN: [id number]          Reason for Visit/Chief Complaint   Labs Only       History of Present Illness (HPI)     Audio Only Telehealth Visit     The patient location is: [place], [place]  The chief complaint leading to consultation is: Lab review  Visit type: Virtual visit with audio only (telephone)  Total time spent with patient: 20 minutes     The reason for the audio only service rather than synchronous audio and video virtual visit was related to technical difficulties or patient preference/necessity.     Each patient to whom I provide medical services by telemedicine is:  (1) informed of the relationship between the physician and patient and the respective role of any other health care provider with respect to management of the patient; and (2) notified that they may decline to receive medical services by telemedicine and may withdraw from such care at any time. Patient verbally consented to receive this service via voice-only telephone call.       Dafne Traore is a 32 y.o. Black female presenting via audio only for Labs Only.Patient states she is trying to conceive and has been unsuccessful. She says she does not have any children, but her spouse has children. She says her menstrual cycle is monthly, but it does not come on the same date each month as she used to. She thinks she may have PCOS. She says she does have hair that grows on her chin.       At last office visit, she complained of constipation. She was prescribed Linzess, she says she has been having regular bowel movements.     FSH, LH, and testosterone all WNL. Iron saturation-5. Vitamin D-21.8. Patient is not taking any supplements. She endorses fatigue.    Denies smoking, drinking, or illicit drug use. Denies chest pain, shortness of breath, cough, headache, dizziness, weakness, abdominal pain, nausea, vomiting, diarrhea, constipation, dysuria, depression, anxiety, SI, and  HI.        Cervical Cancer Screening - Last Pap on 2021 at [facility]. Follow up with GYN Clinic for annual Pap/Pelvic.  Breast Cancer Screening -Deferred due to age.   Colon Cancer Screening - Deferred due to age.   Osteoporosis Screening -Deferred due to age.   Vaccinations: Flu - Not currently being offered. / Tetanus - 5/20/2021 /COVID - 7/7/2021 ([facility]).       This service was not originating from a related E/M service provided within the previous 7 days nor will  to an E/M service or procedure within the next 24 hours or my soonest available appointment.  Prevailing standard of care was able to be met in this audio-only visit.        Review of Systems     Review of Systems   Constitutional: Negative.    HENT: Negative.    Eyes: Negative.    Respiratory: Negative.    Cardiovascular: Negative.    Gastrointestinal: Negative.    Endocrine: Negative.         Hair on chin.   Genitourinary: Negative.    Musculoskeletal: Negative.    Allergic/Immunologic: Negative.    Neurological: Negative.    Hematological: Negative.    Psychiatric/Behavioral: Negative.        Medical / Social / Family History   History reviewed. No pertinent past medical history.      History reviewed. No pertinent surgical history.      Social History  Dafne Zavala  reports that she has never smoked. She has never used smokeless tobacco. She reports current alcohol use. She reports that she does not use drugs.    Family History  Dafne Traore's family history includes Diabetes in her mother; Stroke in her father.    Medications and Allergies     Medications  Medication List with Changes/Refills   New Medications    CHOLECALCIFEROL, VITAMIN D3, 1,250 MCG (50,000 UNIT) CAPSULE    Take 1 capsule (50,000 Units total) by mouth every 7 days.    FERROUS SULFATE 325 (65 FE) MG EC TABLET    Take 1 tablet (325 mg total) by mouth once daily.   Current Medications    LINACLOTIDE (LINZESS) 72 MCG CAP CAPSULE    Take 1 capsule (72 mcg total) by mouth  before breakfast.    SUMATRIPTAN (IMITREX) 50 MG TABLET    Take 1 tablet (50 mg total) by mouth every 6 (six) hours as needed (headache).         Allergies  Review of patient's allergies indicates:  No Known Allergies    Physical Examination   There were no vitals filed for this visit.  Physical Exam  Pulmonary:      Effort: Pulmonary effort is normal.   Neurological:      General: No focal deficit present.      Mental Status: She is alert and oriented to person, place, and time.   Psychiatric:         Mood and Affect: Mood normal.         Behavior: Behavior normal.         Thought Content: Thought content normal.         Judgment: Judgment normal.           Results     Lab Results   Component Value Date    WBC 4.7 07/29/2022    RBC 5.29 07/29/2022    HGB 10.6 (L) 07/29/2022    HCT 35.7 (L) 07/29/2022    MCV 67.5 (L) 07/29/2022    MCH 20.0 (L) 07/29/2022    MCHC 29.7 (L) 07/29/2022    RDW 18.6 (H) 07/29/2022     07/29/2022    MPV 9.7 07/29/2022     Lab Results   Component Value Date     07/29/2022    K 3.9 07/29/2022    CO2 26 07/29/2022    BUN 8.3 07/29/2022    CREATININE 0.80 07/29/2022    CALCIUM 9.5 07/29/2022    ALBUMIN 4.0 07/29/2022    BILITOT 0.3 07/29/2022    ALKPHOS 68 07/29/2022    AST 23 07/29/2022    ALT 23 07/29/2022    EGFRIFAFRICA >60 07/29/2022    EGFRNONAA 86 (L) 01/15/2021     Lab Results   Component Value Date    TSH 0.7843 07/29/2022     Lab Results   Component Value Date    CHOL 175 07/29/2022    HDL 75 (H) 07/29/2022    LDL 91.00 07/29/2022    TRIG 43 07/29/2022     Lab Results   Component Value Date    APPEARANCEUA Clear 07/29/2022    LEUKOCYTESUR Negative 07/29/2022    RBCUA 0-5 07/29/2022    WBCUA 0-5 07/29/2022    BACTERIA None Seen 07/29/2022    HYALINECASTS None Seen 07/29/2022     Lab Results   Component Value Date    CREATRANDUR 192.2 (H) 07/29/2022     Lab Results   Component Value Date    [id number] 21.8 (L) 07/29/2022     Lab Results   Component Value Date    HIV  Nonreactive 07/29/2022    HEPAIGM Nonreactive 07/29/2022    HEPBCOREM Nonreactive 07/29/2022    HEPCAB Nonreactive 07/29/2022     No results found for: FITDIAG, COLOGUARD  No results found for: OCCBLDIA        Assessment and Plan (including Health Maintenance)     Health Maintenance Due   Topic Date Due    Cervical Cancer Screening  Never done    COVID-19 Vaccine (2 - Booster for Tashi series) 09/01/2021       Problem List Items Addressed This Visit        Derm    Other acne    Current Assessment & Plan     Referral to [facility].           Relevant Orders    Ambulatory referral/consult to Dermatology    Female hirsutism    Relevant Orders    Ambulatory referral/consult to Endocrinology       Oncology    Iron deficiency anemia secondary to inadequate dietary iron intake - Primary    Current Assessment & Plan     Lab Results   Component Value Date    HCT 35.7 (L) 07/29/2022    HGB 10.6 (L) 07/29/2022    FERRITIN 6.71 07/29/2022    TIBC 422 07/29/2022    LABIRON 5 (L) 07/29/2022   Rx ferrous sulfate.  Take iron supplements as prescribed.  Add Vitamin C to your diet.  Take iron and vitamin C on an empty stomach for better absorption if tolerated.  Add iron rich foods to diet such as liver, lean beef, eggs, dried fruit, dark green leafy vegetables.  Education provided on additional sources of potassium-rich foods.  Do NOT drink milk or take antacids at the same time you take your iron supplement.  Iron supplements can cause constipation; add stool softener or fiber as needed.             Relevant Medications    ferrous sulfate 325 (65 FE) MG EC tablet    Other Relevant Orders    Iron and TIBC       Endocrine    Vitamin D deficiency    Current Assessment & Plan     Lab Results   Component Value Date    CVVBMSVO05GE 21.8 (L) 07/29/2022   Educated on increasing foods high in Vitamin D such as fish oil, cod liver oil, salmon, milk fortified with vitamin D.  RX Vitamin D3 14803 IU weekly x 12 weeks.  Complete  entire 12 weeks of Vitamin D prescription.  After completion of prescription (12 weeks/3 months), begin taking Vitamin D 2000 I.U. tablets daily (purchase over the counter).  Repeat Vitamin D level as ordered.             Relevant Medications    cholecalciferol, vitamin D3, 1,250 mcg (50,000 unit) capsule    Other Relevant Orders    Vitamin D      Other Visit Diagnoses     Well adult exam        Relevant Orders    CBC Auto Differential    Comprehensive Metabolic Panel    Urinalysis, Reflex to Urine Culture Urine, Clean Catch          Health Maintenance Topics with due status: Not Due       Topic Last Completion Date    TETANUS VACCINE 05/20/2021    Influenza Vaccine Not Due       Future Appointments   Date Time Provider Department Center   2/9/2023  8:30 AM JAH Simpson [facility]            Signature:        JAH Simpson  [facility]  [address] [facility] [id number]    Date of encounter: 8/4/22

## 2022-08-05 PROBLEM — L68.0 FEMALE HIRSUTISM: Status: ACTIVE | Noted: 2022-08-05

## 2022-08-05 NOTE — ASSESSMENT & PLAN NOTE
Lab Results   Component Value Date    HCT 35.7 (L) 07/29/2022    HGB 10.6 (L) 07/29/2022    FERRITIN 6.71 07/29/2022    TIBC 422 07/29/2022    LABIRON 5 (L) 07/29/2022   Rx ferrous sulfate.  Take iron supplements as prescribed.  Add Vitamin C to your diet.  Take iron and vitamin C on an empty stomach for better absorption if tolerated.  Add iron rich foods to diet such as liver, lean beef, eggs, dried fruit, dark green leafy vegetables.  Education provided on additional sources of potassium-rich foods.  Do NOT drink milk or take antacids at the same time you take your iron supplement.  Iron supplements can cause constipation; add stool softener or fiber as needed.

## 2022-11-17 ENCOUNTER — OFFICE VISIT (OUTPATIENT)
Dept: FAMILY MEDICINE | Facility: CLINIC | Age: 32
End: 2022-11-17
Payer: MEDICAID

## 2022-11-17 VITALS
BODY MASS INDEX: 28.61 KG/M2 | WEIGHT: 178 LBS | DIASTOLIC BLOOD PRESSURE: 80 MMHG | RESPIRATION RATE: 18 BRPM | HEIGHT: 66 IN | HEART RATE: 78 BPM | OXYGEN SATURATION: 100 % | TEMPERATURE: 98 F | SYSTOLIC BLOOD PRESSURE: 120 MMHG

## 2022-11-17 DIAGNOSIS — L70.9 ACNE, UNSPECIFIED ACNE TYPE: Primary | ICD-10-CM

## 2022-11-17 DIAGNOSIS — L68.0 HIRSUTISM: ICD-10-CM

## 2022-11-17 DIAGNOSIS — L70.8 OTHER ACNE: ICD-10-CM

## 2022-11-17 PROCEDURE — 99215 OFFICE O/P EST HI 40 MIN: CPT | Mod: PBBFAC | Performed by: DERMATOLOGY

## 2022-11-17 RX ORDER — CLINDAMYCIN AND BENZOYL PEROXIDE 10; 50 MG/G; MG/G
GEL TOPICAL
Qty: 50 G | Refills: 1 | Status: SHIPPED | OUTPATIENT
Start: 2022-11-17 | End: 2023-04-20 | Stop reason: SDUPTHER

## 2022-11-17 RX ORDER — TRETINOIN 0.25 MG/G
CREAM TOPICAL
Qty: 45 G | Refills: 1 | Status: SHIPPED | OUTPATIENT
Start: 2022-11-17 | End: 2022-12-13 | Stop reason: SDUPTHER

## 2022-11-17 RX ORDER — DOXYCYCLINE 100 MG/1
CAPSULE ORAL
Qty: 90 CAPSULE | Refills: 0 | Status: SHIPPED | OUTPATIENT
Start: 2022-11-17 | End: 2022-12-13 | Stop reason: SDUPTHER

## 2022-11-17 NOTE — PROGRESS NOTES
I saw the patient with the resident physician, evaluated and recommended treatment.    Major Cohen MD

## 2022-11-17 NOTE — PATIENT INSTRUCTIONS
Start  Over the counter Cetaphil mild cleanser twice a day  Benzaclin.  Apply a thin coat to acne 3 mornings a week (M, W, F)  Tretinoin cream 0.025%.  Apply a thin coat to acne 3 nights a week (Tu, Th, Sat).  Discontinue if you think you are pregnant.   Take Doxycycline 100 mg capsule twice a day for 30 days, then once a day there after.  Discontinue if you think you are pregnant.  Use over the counter Cerave moisturizing lotion as often as needed.    Avoid  Touching your face  Hair touching face.

## 2022-11-17 NOTE — PROGRESS NOTES
[facility] OFFICE VISIT NOTE  Dafne Traore  [id number]  11/17/2022      Chief Complaint: Acne (New referral for Acne)      HPI    Dafne Traore is a 32 y.o. female presenting to [facility] for a new patient visit for acne.    -duration 5 months  -different type of acne from when she was a teenager.  Acne is now hard and nodular.    -oily skin  -has been using [employer] care products  -currently trying to conceive        ROS:  CONSTITUTIONAL: No weight loss, fever, chills, or weakness.    Ears, Nose, Throat: No congestion, runny nose or sore throat.    SKIN: ance on face   CARDIOVASCULAR: No chest pain, chest pressure, or chest discomfort. No palpitations.    RESPIRATORY: No shortness of breath, cough, or sputum.    GASTROINTESTINAL: No nausea, vomiting or diarrhea.     Vitals:    11/17/22 0858   BP: 120/80   Pulse: 78   Resp: 18   Temp: 98.1 °F (36.7 °C)       PE:  General: appears well, in no acute distress   Eye: no scleral icterus   HENT: MMM  Respiratory: nonlabored respirations   Integumentary:   Cheek and temples with multiple papulopustular, few open and closed comedones, extensive post inflammatory hyperpigmentation, mid acne scaring.        Current Medications:   Current Outpatient Medications   Medication Sig Dispense Refill    cholecalciferol, vitamin D3, 1,250 mcg (50,000 unit) capsule Take 1 capsule (50,000 Units total) by mouth every 7 days. 12 capsule 0    clindamycin-benzoyl peroxide (BENZACLIN) gel Apply a thin coat to acne 3 mornings a week (M, W, F). 50 g 1    doxycycline (MONODOX) 100 MG capsule Take 1 capsule (100 mg total) by mouth 2 (two) times daily for 30 days, THEN 1 capsule (100 mg total) once daily. 90 capsule 0    ferrous sulfate 325 (65 FE) MG EC tablet Take 1 tablet (325 mg total) by mouth once daily. 90 tablet 1    linaCLOtide (LINZESS) 72 mcg Cap capsule Take 1 capsule (72 mcg total) by mouth before breakfast. 90 capsule 1    sumatriptan (IMITREX) 50 MG tablet Take 1  tablet (50 mg total) by mouth every 6 (six) hours as needed (headache). 30 tablet 1    tretinoin (RETIN-A) 0.025 % cream Apply thin coat to acne 3 nights a week (Tu, Th, Sat). 45 g 1     No current facility-administered medications for this visit.       Assessment:   1. Acne, unspecified acne type    2. Other acne    3. Hirsutism        Plan:    Start  Over the counter Cetaphil mild cleanser twice a day  Benzaclin.  Apply a thin coat to acne 3 mornings a week (M, W, F)  Tretinoin cream 0.025%.  Apply a thin coat to acne 3 nights a week (Tu, Th, Sat).  Discontinue if you think you are pregnant.   Take Doxycycline 100 mg capsule twice a day for 30 days, then once a day there after.  Discontinue if you think you are pregnant.  Use over the counter Cerave moisturizing lotion as often as needed.    Avoid  Touching your face  Hair touching face.    Patient is currently trying to conceive so will avoid Spironolactone at this time.     Return to clinic in 2 months    Ashley Shelton M.D.  [facility]

## 2022-12-13 DIAGNOSIS — L70.8 OTHER ACNE: ICD-10-CM

## 2022-12-13 RX ORDER — DOXYCYCLINE 100 MG/1
CAPSULE ORAL
Qty: 90 CAPSULE | Refills: 0 | Status: SHIPPED | OUTPATIENT
Start: 2022-12-13 | End: 2023-01-30 | Stop reason: SDUPTHER

## 2022-12-13 RX ORDER — TRETINOIN 0.25 MG/G
CREAM TOPICAL
Qty: 45 G | Refills: 1 | Status: SHIPPED | OUTPATIENT
Start: 2022-12-13 | End: 2023-04-20 | Stop reason: SDUPTHER

## 2023-01-04 ENCOUNTER — TELEPHONE (OUTPATIENT)
Dept: INTERNAL MEDICINE | Facility: CLINIC | Age: 33
End: 2023-01-04
Payer: COMMERCIAL

## 2023-01-04 NOTE — TELEPHONE ENCOUNTER
Pt called and stated that her sumatriptan is not helping her and that her head has been pounding since last night. On a pain scale from 1-10 pt states that her pain is a 10. Pt also stated that she was told if the medication is not working for her then, an order for a CT would be put in for her.

## 2023-01-05 NOTE — TELEPHONE ENCOUNTER
Letha,   Please tell patient if her headache is a 10/10 I would advise her to go to the ED. If I order a CT, it has to go through insurance verification which can take a while. If she would like an appt, please forward message to Ms. Judd.    Thank you,    Daniella Lopez, JASEC

## 2023-01-14 ENCOUNTER — OFFICE VISIT (OUTPATIENT)
Dept: URGENT CARE | Facility: CLINIC | Age: 33
End: 2023-01-14
Payer: COMMERCIAL

## 2023-01-14 VITALS
TEMPERATURE: 98 F | WEIGHT: 184.5 LBS | SYSTOLIC BLOOD PRESSURE: 114 MMHG | BODY MASS INDEX: 30.74 KG/M2 | DIASTOLIC BLOOD PRESSURE: 69 MMHG | OXYGEN SATURATION: 100 % | HEIGHT: 65 IN | HEART RATE: 91 BPM | RESPIRATION RATE: 16 BRPM

## 2023-01-14 DIAGNOSIS — N94.10 DYSPAREUNIA IN FEMALE: ICD-10-CM

## 2023-01-14 DIAGNOSIS — Z20.2 ENCOUNTER FOR ASSESSMENT OF STD EXPOSURE: Primary | ICD-10-CM

## 2023-01-14 LAB
C TRACH DNA SPEC QL NAA+PROBE: NOT DETECTED
CLUE CELLS VAG QL WET PREP: ABNORMAL
N GONORRHOEA DNA SPEC QL NAA+PROBE: NOT DETECTED
T VAGINALIS VAG QL WET PREP: ABNORMAL
WBC #/AREA VAG WET PREP: ABNORMAL
YEAST SPEC QL WET PREP: ABNORMAL

## 2023-01-14 PROCEDURE — 87661 TRICHOMONAS VAGINALIS AMPLIF: CPT

## 2023-01-14 PROCEDURE — 87491 CHLMYD TRACH DNA AMP PROBE: CPT

## 2023-01-14 PROCEDURE — 87591 N.GONORRHOEAE DNA AMP PROB: CPT

## 2023-01-14 PROCEDURE — 99214 OFFICE O/P EST MOD 30 MIN: CPT | Mod: S$PBB,,,

## 2023-01-14 PROCEDURE — 99214 OFFICE O/P EST MOD 30 MIN: CPT | Mod: PBBFAC

## 2023-01-14 PROCEDURE — 87210 SMEAR WET MOUNT SALINE/INK: CPT

## 2023-01-14 PROCEDURE — 99214 PR OFFICE/OUTPT VISIT, EST, LEVL IV, 30-39 MIN: ICD-10-PCS | Mod: S$PBB,,,

## 2023-01-14 RX ORDER — METRONIDAZOLE 500 MG/1
500 TABLET ORAL EVERY 12 HOURS
Qty: 14 TABLET | Refills: 0 | Status: SHIPPED | OUTPATIENT
Start: 2023-01-14 | End: 2023-01-21

## 2023-01-14 RX ORDER — VALACYCLOVIR HYDROCHLORIDE 1 G/1
1000 TABLET, FILM COATED ORAL EVERY 8 HOURS
COMMUNITY
Start: 2022-09-25

## 2023-01-14 NOTE — PROGRESS NOTES
"Subjective:       Patient ID: Dafne Traore is a 32 y.o. female.    Vitals:  height is 5' 5" (1.651 m) and weight is 83.7 kg (184 lb 8 oz). Her temperature is 97.5 °F (36.4 °C). Her blood pressure is 114/69 and her pulse is 91. Her respiration is 16 and oxygen saturation is 100%.     Chief Complaint: Requesting STI screening (Requesting STI screening. Lower abd itching (no rash,) discomfort during intercourse. Denies any other symptoms.)    Pt is requesting STD testing. Denies urinary symptoms. Denies discharge. States pain during intercourse.       Constitution: Negative.   HENT: Negative.     Neck: neck negative.   Cardiovascular: Negative.    Eyes: Negative.    Respiratory: Negative.     Gastrointestinal: Negative.    Genitourinary:  Positive for painful ejaculation and painful ejaculation.   Musculoskeletal: Negative.    Skin: Negative.    Neurological: Negative.      Objective:      Physical Exam   Constitutional: She is oriented to person, place, and time.   HENT:   Head: Normocephalic.   Eyes: Pupils are equal, round, and reactive to light.   Cardiovascular: Normal rate and normal pulses.   Pulmonary/Chest: Effort normal.   Abdominal: Normal appearance. She exhibits no distension and no mass. Soft. There is no abdominal tenderness. There is no rebound, no guarding, no left CVA tenderness and no right CVA tenderness. No hernia.   Musculoskeletal: Normal range of motion.         General: Normal range of motion.   Neurological: She is alert and oriented to person, place, and time.   Skin: Skin is warm and dry.   Vitals reviewed.      Assessment:       1. Encounter for assessment of STD exposure            Plan:         Encounter for assessment of STD exposure  -     Ambulatory referral/consult to Gynecology  -     Chlamydia/GC, PCR  -     T.vaginalisisc, Amplified RNA  -     Wet Prep, Genital    - abstain from sex until all results are known    - blood tests are a same day result  - this clinic will ONLY call " you for POSITIVE = ABNORMAL results. We will not call you to tell you tests are NEGATIVE = NORMAL.  - if you need medication to treat any conditions, it was either prescribed to you today and sent to your pharmacy, or it will be sent when providers view abnormal results.  - if any of your tests are abnormal, we will call you with a plan of care.  - always require condoms  - partners will need treatment for any +STDs on your testing.     ER precautions given, patient verbalized understanding.     Please see provided patient education for guidance.    Follow up with PCP or return to clinic if symptoms worsen or do not improve.

## 2023-01-14 NOTE — PROGRESS NOTES
Pt has BV.    Bacterial Vaginosis is not an STD. It is a pH imbalance in the vagina that can cause discharge, odor, discomfort. Metronidazole (Flagyl) has been sent to your pharmacy. Take it twice a day for 7 days. Do not take bubble baths, use bath bombs, douches, or any scented tampons/pads. Do not drink any alcohol while on this medication and for 3 days after completing it, as that will cause violent illness.

## 2023-01-15 ENCOUNTER — TELEPHONE (OUTPATIENT)
Dept: URGENT CARE | Facility: CLINIC | Age: 33
End: 2023-01-15
Payer: COMMERCIAL

## 2023-01-15 NOTE — TELEPHONE ENCOUNTER
----- Message from Angelica Diaz NP sent at 1/14/2023  5:50 PM CST -----  Pt has BV.    Bacterial Vaginosis is not an STD. It is a pH imbalance in the vagina that can cause discharge, odor, discomfort. Metronidazole (Flagyl) has been sent to your pharmacy. Take it twice a day for 7 days. Do not take bubble baths, use bath bombs, douches, or any scented tampons/pads. Do not drink any alcohol while on this medication and for 3 days after completing it, as that will cause violent illness.

## 2023-01-17 ENCOUNTER — APPOINTMENT (OUTPATIENT)
Dept: LAB | Facility: HOSPITAL | Age: 33
End: 2023-01-17
Payer: COMMERCIAL

## 2023-01-17 DIAGNOSIS — E55.9 VITAMIN D DEFICIENCY: ICD-10-CM

## 2023-01-17 DIAGNOSIS — Z00.00 ENCOUNTER FOR WELLNESS EXAMINATION IN ADULT: Primary | ICD-10-CM

## 2023-01-17 DIAGNOSIS — D50.8 IRON DEFICIENCY ANEMIA SECONDARY TO INADEQUATE DIETARY IRON INTAKE: ICD-10-CM

## 2023-01-17 LAB
SPECIMEN SOURCE: NORMAL
T VAGINALIS RRNA SPEC QL NAA+PROBE: NEGATIVE

## 2023-01-18 ENCOUNTER — OFFICE VISIT (OUTPATIENT)
Dept: INTERNAL MEDICINE | Facility: CLINIC | Age: 33
End: 2023-01-18
Payer: COMMERCIAL

## 2023-01-18 VITALS
DIASTOLIC BLOOD PRESSURE: 77 MMHG | BODY MASS INDEX: 30.71 KG/M2 | HEART RATE: 98 BPM | SYSTOLIC BLOOD PRESSURE: 111 MMHG | TEMPERATURE: 98 F | HEIGHT: 65 IN | WEIGHT: 184.31 LBS | OXYGEN SATURATION: 100 % | RESPIRATION RATE: 20 BRPM

## 2023-01-18 DIAGNOSIS — D50.8 IRON DEFICIENCY ANEMIA SECONDARY TO INADEQUATE DIETARY IRON INTAKE: ICD-10-CM

## 2023-01-18 DIAGNOSIS — G43.909 MIGRAINE WITHOUT STATUS MIGRAINOSUS, NOT INTRACTABLE, UNSPECIFIED MIGRAINE TYPE: Primary | ICD-10-CM

## 2023-01-18 PROCEDURE — 99214 OFFICE O/P EST MOD 30 MIN: CPT | Mod: S$PBB,,,

## 2023-01-18 PROCEDURE — 81001 URINALYSIS AUTO W/SCOPE: CPT

## 2023-01-18 PROCEDURE — 99215 OFFICE O/P EST HI 40 MIN: CPT | Mod: PBBFAC

## 2023-01-18 PROCEDURE — 99214 PR OFFICE/OUTPT VISIT, EST, LEVL IV, 30-39 MIN: ICD-10-PCS | Mod: S$PBB,,,

## 2023-01-18 RX ORDER — FERROUS SULFATE 325(65) MG
325 TABLET, DELAYED RELEASE (ENTERIC COATED) ORAL EVERY OTHER DAY
Qty: 90 TABLET | Refills: 1 | Status: SHIPPED | OUTPATIENT
Start: 2023-01-18 | End: 2023-09-05 | Stop reason: ALTCHOICE

## 2023-01-18 RX ORDER — SUMATRIPTAN SUCCINATE 100 MG/1
TABLET ORAL
Qty: 45 TABLET | Refills: 1 | Status: SHIPPED | OUTPATIENT
Start: 2023-01-18

## 2023-01-18 NOTE — PROGRESS NOTES
"    PATIENT NAME: Dafne Traore  : 1990  DATE: 23  MRN: [id number]          Reason for Visit/Chief Complaint   Follow-up, Labs Only, and Migraine       History of Present Illness (HPI)     Dafne Traore is a 32 y.o. female presenting in clinic today for Follow-up, Labs Only, and Migraine. PMH: migraine headaches. Patient states she is trying to conceive and has been unsuccessful. She says she does not have any children, but her spouse has children. She says her menstrual cycle is monthly, but it does not come on the same date each month as she used to. She thinks she may have PCOS. She says she does have hair that grows on her chin.  She is currently being treated for BV with metronidazole.      She is continuing to complain of a left temporal HA radiates around to the back of head. Quality is throbbing , "it is just painful."Migraine is associated L hemiplegia, watery eyes, photophobia and phonophobia and at times nausea. She denies dizziness. She says it occurs daily, but then sometimes 3xs per week that lasts a few hours when it occurs. She stated the last migraine lasted "all day." Declines an aura. She is prescribed imitrex, she states initially it provided abortive therapy, but not for the last few migraines.       All pertinent labs dated 2023 and diagnotic tests reviewed and discussed with patient.     Denies smoking, drinking, or illicit drug use. Denies chest pain, shortness of breath, cough, headache, dizziness, weakness, abdominal pain, nausea, vomiting, diarrhea, constipation, dysuria, depression, anxiety, SI, and HI.        Cervical Cancer Screening - Last Pap on  at [facility]. Follow up with GYN Clinic for annual Pap/Pelvic.  Breast Cancer Screening -Deferred due to age.   Colon Cancer Screening - Deferred due to age.   Osteoporosis Screening -Deferred due to age.   Vaccinations: Flu - Declines / Tetanus - 2021 /COVID - 2021 ([facility]).       Review of Systems     Review of " Systems   Constitutional: Negative.    HENT: Negative.     Eyes: Negative.    Respiratory: Negative.     Cardiovascular: Negative.    Gastrointestinal: Negative.    Endocrine: Negative.    Genitourinary: Negative.  Negative for vaginal discharge.   Musculoskeletal: Negative.    Skin: Negative.    Allergic/Immunologic: Negative.    Neurological:  Positive for headaches. Negative for dizziness.   Hematological: Negative.    Psychiatric/Behavioral: Negative.     All other systems reviewed and are negative.    Medical / Social / Family History     Past Medical History:   Diagnosis Date    Anemia     Constipation      History reviewed. No pertinent surgical history.      Social History  Dafne Zavala  reports that she has never smoked. She has never used smokeless tobacco. She reports current alcohol use. She reports that she does not use drugs.    Family History  Dafne Felixs family history includes Diabetes in her mother; Stroke in her father.    Medications and Allergies     Medications  Current Outpatient Medications   Medication Instructions    cholecalciferol (vitamin D3) 50,000 Units, Oral, Every 7 days    clindamycin-benzoyl peroxide (BENZACLIN) gel Apply a thin coat to acne 3 mornings a week (M, W, F).    doxycycline (MONODOX) 100 MG capsule Take 1 capsule (100 mg total) by mouth once daily for 30 days, THEN 1 capsule (100 mg total) once daily.    ferrous sulfate 325 mg, Oral, Every other day    linaCLOtide (LINZESS) 72 mcg, Oral, Before breakfast    metroNIDAZOLE (FLAGYL) 500 mg, Oral, Every 12 hours    sumatriptan (IMITREX) 100 MG tablet Take 1 tablet at the start of migraine, if migraine persists after 2 hours, please take an additional dose. Max dose 200 mg/day.    tretinoin (RETIN-A) 0.025 % cream Apply thin coat to acne 3 nights a week (Tu, Th, Sat).    valACYclovir (VALTREX) 1,000 mg, Oral, Every 8 hours           Allergies  Review of patient's allergies indicates:  No Known  "Allergies    Physical Examination   Visit Vitals  /77 (BP Location: Right arm, Patient Position: Sitting, BP Method: Large (Automatic))   Pulse 98   Temp 98.3 °F (36.8 °C) (Oral)   Resp 20   Ht 5' 5" (1.651 m)   Wt 83.6 kg (184 lb 4.9 oz)   LMP 01/13/2023   SpO2 100%   BMI 30.67 kg/m²     Physical Exam  Vitals reviewed.   Constitutional:       Appearance: Normal appearance. She is normal weight.   HENT:      Head: Normocephalic and atraumatic.      Right Ear: External ear normal.      Left Ear: External ear normal.      Nose: Nose normal.      Mouth/Throat:      Mouth: Mucous membranes are moist.      Pharynx: Oropharynx is clear.   Eyes:      Extraocular Movements: Extraocular movements intact.      Conjunctiva/sclera: Conjunctivae normal.      Pupils: Pupils are equal, round, and reactive to light.   Cardiovascular:      Rate and Rhythm: Normal rate and regular rhythm.      Pulses: Normal pulses.      Heart sounds: Normal heart sounds.   Pulmonary:      Effort: Pulmonary effort is normal.      Breath sounds: Normal breath sounds.   Abdominal:      General: Bowel sounds are normal.      Palpations: Abdomen is soft.   Musculoskeletal:         General: Normal range of motion.      Cervical back: Normal range of motion and neck supple.   Skin:     General: Skin is warm and dry.      Capillary Refill: Capillary refill takes less than 2 seconds.   Neurological:      General: No focal deficit present.      Mental Status: She is alert and oriented to person, place, and time.   Psychiatric:         Mood and Affect: Mood normal.         Behavior: Behavior normal.         Thought Content: Thought content normal.         Judgment: Judgment normal.         Results     Lab Results   Component Value Date    WBC 5.9 01/17/2023    RBC 5.13 01/17/2023    HGB 10.5 (L) 01/17/2023    HCT 34.5 (L) 01/17/2023    MCV 67.3 (L) 01/17/2023    MCH 20.5 01/17/2023    MCHC 30.4 (L) 01/17/2023    RDW 18.0 (H) 01/17/2023     " 01/17/2023    MPV 10.1 01/17/2023      Lab Results   Component Value Date     01/17/2023    K 4.0 01/17/2023    CHLORIDE 105 01/17/2023    CO2 26 01/17/2023    GLUCOSE 86 01/17/2023    BUN 10.4 01/17/2023    CREATININE 0.79 01/17/2023    LABPROT 8.1 01/17/2023    ALBUMIN 3.8 01/17/2023    BILITOT 0.2 01/17/2023    ALKPHOS 75 01/17/2023    AST 22 01/17/2023    ALT 27 01/17/2023    EGFRNORACEVR >60 01/17/2023     Lab Results   Component Value Date    TSH 0.7843 07/29/2022     Lab Results   Component Value Date    CHOL 175 07/29/2022    HDL 75 (H) 07/29/2022    LDL 91.00 07/29/2022    TRIG 43 07/29/2022     Lab Results   Component Value Date    COLORUA Light-Yellow 01/18/2023    SGUA 1.024 01/18/2023    PROTEINUA Negative 01/18/2023    GLUCOSEUA Normal 01/18/2023    BILIRUBINUA Negative 01/18/2023    BLOODUA Negative 01/18/2023    WBCUA 0-5 01/18/2023    RBCUA 0-5 01/18/2023    BACTERIA None Seen 01/18/2023    NITRITESUA Negative 01/18/2023    LEUKOCYTESUR Negative 01/18/2023    UROBILINOGEN Normal 01/18/2023     Lab Results   Component Value Date    CREATRANDUR 192.2 (H) 07/29/2022    MICALBCREAT 8.4 07/29/2022     Lab Results   Component Value Date    ALXOWRZS58NH 43.1 01/17/2023     Lab Results   Component Value Date    HIV Nonreactive 07/29/2022    HEPAIGM Nonreactive 07/29/2022    HEPBCOREM Nonreactive 07/29/2022    HEPCAB Nonreactive 07/29/2022       Assessment        ICD-10-CM ICD-9-CM   1. Migraine without status migrainosus, not intractable, unspecified migraine type  G43.909 346.90   2. Iron deficiency anemia secondary to inadequate dietary iron intake  D50.8 280.1   3. BMI 30.0-30.9,adult  Z68.30 V85.30        Plan (including Health Maintenance)     1. Migraine without status migrainosus, not intractable, unspecified migraine type  Increase imitrex.   Avoid triggers such as bright lights, alcohol, nicotine, aged cheeses, chocolate, caffeine, foods/drinks that contain nitrates or aspartame.   Take meds  as prescribed.   Lie in a quiet, dark room if possible.   Limit stress and get at least 8 hours of sleep per night.    - sumatriptan (IMITREX) 100 MG tablet; Take 1 tablet at the start of migraine, if migraine persists after 2 hours, please take an additional dose. Max dose 200 mg/day.  Dispense: 45 tablet; Refill: 1  - CT Head Without Contrast; Future  - Ambulatory referral/consult to Neurology; Future    2. Iron deficiency anemia secondary to inadequate dietary iron intake  Lab Results   Component Value Date    HCT 34.5 (L) 01/17/2023    HGB 10.5 (L) 01/17/2023    FERRITIN 6.71 07/29/2022    TIBC 395 01/17/2023    LABIRON 19 (L) 01/17/2023   Ok to take iron every other day due to constipation.  Add Vitamin C to your diet.  Take iron and vitamin C on an empty stomach for better absorption if tolerated.  Add iron rich foods to diet such as liver, lean beef, eggs, dried fruit, dark green leafy vegetables.  Education provided on additional sources of iron-rich foods.  Do NOT drink milk or take antacids at the same time you take your iron supplement.  Iron supplements can cause constipation; add stool softener or fiber as needed.   - ferrous sulfate 325 (65 FE) MG EC tablet; Take 1 tablet (325 mg total) by mouth every other day.  Dispense: 90 tablet; Refill: 1  - CBC Auto Differential; Future  - Iron and TIBC; Future  - Ferritin; Future    3. BMI 30.0-30.9,adult  Body mass index is 30.67 kg/m².  Goal BMI <30.  Aerobic exercise 150 minutes per week.  Avoid soda, simple sugars, sweets, excessive rice, pasta, potatoes or bread.   Choose brown options when available and portion control.  Limit fast foods and fried foods.   Choose complex carbs in moderation (ex: green, leafy vegetables, beans, oatmeal).  Eat plenty of fresh fruits and vegetables with lean meats daily.   Consider permanent healthy lifestyle changes.       Health Maintenance Due   Topic Date Due    Cervical Cancer Screening  Never done    COVID-19 Vaccine (2  - Booster for Tashi series) 09/01/2021       Health Maintenance Topics with due status: Not Due       Topic Last Completion Date    TETANUS VACCINE 05/20/2021       Future Appointments   Date Time Provider Department Center   1/20/2023  4:15 PM [facility] CT1 [facility]   2/2/2023  7:00 AM Major Cohen MD [facility]   2/9/2023  8:30 AM JAH Simpson [facility]   4/18/2023  7:30 AM [facility]       D/c appt on 2/9/2023.  RTC in 3 month(s) for and prn.  Labs within a week of visit.        Signature:        JAH Simpson  [facility]  [address] [id number]    Date of encounter: 1/18/23

## 2023-01-18 NOTE — PATIENT INSTRUCTIONS
REMINDER: Please complete labs within 1 week of appointment.  Please complete satisfaction survey when received. Thank you.

## 2023-01-22 ENCOUNTER — PATIENT MESSAGE (OUTPATIENT)
Dept: INTERNAL MEDICINE | Facility: CLINIC | Age: 33
End: 2023-01-22
Payer: COMMERCIAL

## 2023-01-23 ENCOUNTER — PATIENT MESSAGE (OUTPATIENT)
Dept: INTERNAL MEDICINE | Facility: CLINIC | Age: 33
End: 2023-01-23
Payer: COMMERCIAL

## 2023-01-30 ENCOUNTER — OFFICE VISIT (OUTPATIENT)
Dept: URGENT CARE | Facility: CLINIC | Age: 33
End: 2023-01-30
Payer: COMMERCIAL

## 2023-01-30 VITALS
HEIGHT: 65 IN | WEIGHT: 186 LBS | BODY MASS INDEX: 30.99 KG/M2 | TEMPERATURE: 98 F | RESPIRATION RATE: 20 BRPM | SYSTOLIC BLOOD PRESSURE: 117 MMHG | DIASTOLIC BLOOD PRESSURE: 74 MMHG | HEART RATE: 117 BPM | OXYGEN SATURATION: 100 %

## 2023-01-30 DIAGNOSIS — N76.0 BACTERIAL VAGINOSIS: Primary | ICD-10-CM

## 2023-01-30 DIAGNOSIS — L70.8 OTHER ACNE: ICD-10-CM

## 2023-01-30 DIAGNOSIS — N89.8 VAGINAL IRRITATION: ICD-10-CM

## 2023-01-30 DIAGNOSIS — B96.89 BACTERIAL VAGINOSIS: Primary | ICD-10-CM

## 2023-01-30 LAB
CLUE CELLS VAG QL WET PREP: ABNORMAL
T VAGINALIS VAG QL WET PREP: ABNORMAL
WBC #/AREA VAG WET PREP: ABNORMAL
YEAST SPEC QL WET PREP: ABNORMAL

## 2023-01-30 PROCEDURE — 99213 PR OFFICE/OUTPT VISIT, EST, LEVL III, 20-29 MIN: ICD-10-PCS | Mod: S$PBB,,, | Performed by: NURSE PRACTITIONER

## 2023-01-30 PROCEDURE — 99215 OFFICE O/P EST HI 40 MIN: CPT | Mod: PBBFAC | Performed by: NURSE PRACTITIONER

## 2023-01-30 PROCEDURE — 99213 OFFICE O/P EST LOW 20 MIN: CPT | Mod: S$PBB,,, | Performed by: NURSE PRACTITIONER

## 2023-01-30 PROCEDURE — 87210 SMEAR WET MOUNT SALINE/INK: CPT | Performed by: NURSE PRACTITIONER

## 2023-01-30 RX ORDER — DOXYCYCLINE 100 MG/1
100 CAPSULE ORAL DAILY
Qty: 90 CAPSULE | Refills: 0 | Status: SHIPPED | OUTPATIENT
Start: 2023-01-30 | End: 2023-04-20

## 2023-01-30 RX ORDER — CLINDAMYCIN HYDROCHLORIDE 300 MG/1
300 CAPSULE ORAL 2 TIMES DAILY
Qty: 14 CAPSULE | Refills: 0 | Status: SHIPPED | OUTPATIENT
Start: 2023-01-30 | End: 2023-02-06

## 2023-01-31 ENCOUNTER — TELEPHONE (OUTPATIENT)
Dept: URGENT CARE | Facility: CLINIC | Age: 33
End: 2023-01-31
Payer: COMMERCIAL

## 2023-01-31 NOTE — TELEPHONE ENCOUNTER
----- Message from JAH Dial sent at 1/31/2023  9:41 AM CST -----  Call with results. Wet prep positive for BV, take medication as ordered.

## 2023-01-31 NOTE — PROGRESS NOTES
"Subjective:       Patient ID: Dafne Traore is a 32 y.o. female.    Vitals:  height is 5' 5" (1.651 m) and weight is 84.4 kg (186 lb). Her oral temperature is 98.2 °F (36.8 °C). Her blood pressure is 117/74 and her pulse is 117 (abnormal). Her respiration is 20 and oxygen saturation is 100%.     Chief Complaint: Vaginal Pain (Patient was seen 1/14 for vaginal discomfort, diagnosed with BV and given Flagyl. Patient reports not feeling better.)    CC as above      Constitution: Negative.   Cardiovascular: Negative.    Respiratory: Negative.     Gastrointestinal: Negative.    Genitourinary:  Positive for vaginal pain.     Objective:      Physical Exam   Constitutional: She is oriented to person, place, and time. She appears well-developed.   HENT:   Head: Normocephalic.   Eyes: Conjunctivae and EOM are normal. Pupils are equal, round, and reactive to light.   Neck: Neck supple.   Cardiovascular: Normal rate, regular rhythm and normal heart sounds.   Pulmonary/Chest: Effort normal and breath sounds normal.   Abdominal: Bowel sounds are normal. Soft.   Musculoskeletal: Normal range of motion.         General: Normal range of motion.   Neurological: She is alert and oriented to person, place, and time.   Skin: Skin is warm and dry.   Psychiatric: Her behavior is normal.   Vitals reviewed.      Assessment:       1. Bacterial vaginosis    2. Vaginal irritation                     Plan:       Wet prep pending, will notify of abnormal results.   Medication as ordered, do not drink alcohol while on this medication.  Avoid any highly fragrant soaps.  Follow-up with PCP.  ER precautions.      Bacterial vaginosis  -     clindamycin (CLEOCIN) 300 MG capsule; Take 1 capsule (300 mg total) by mouth 2 (two) times a day. for 7 days  Dispense: 14 capsule; Refill: 0    Vaginal irritation  -     Wet Prep, Genital                     "

## 2023-04-20 ENCOUNTER — OFFICE VISIT (OUTPATIENT)
Dept: FAMILY MEDICINE | Facility: CLINIC | Age: 33
End: 2023-04-20
Payer: COMMERCIAL

## 2023-04-20 VITALS
WEIGHT: 185 LBS | BODY MASS INDEX: 30.82 KG/M2 | OXYGEN SATURATION: 100 % | HEART RATE: 91 BPM | TEMPERATURE: 99 F | SYSTOLIC BLOOD PRESSURE: 106 MMHG | RESPIRATION RATE: 20 BRPM | HEIGHT: 65 IN | DIASTOLIC BLOOD PRESSURE: 73 MMHG

## 2023-04-20 DIAGNOSIS — L68.0 HIRSUTISM: ICD-10-CM

## 2023-04-20 DIAGNOSIS — L70.8 OTHER ACNE: ICD-10-CM

## 2023-04-20 DIAGNOSIS — L70.9 ACNE, UNSPECIFIED ACNE TYPE: Primary | ICD-10-CM

## 2023-04-20 PROCEDURE — 99214 OFFICE O/P EST MOD 30 MIN: CPT | Mod: PBBFAC | Performed by: DERMATOLOGY

## 2023-04-20 RX ORDER — CLINDAMYCIN AND BENZOYL PEROXIDE 10; 50 MG/G; MG/G
GEL TOPICAL
Qty: 50 G | Refills: 2 | Status: SHIPPED | OUTPATIENT
Start: 2023-04-20

## 2023-04-20 RX ORDER — TRETINOIN 0.25 MG/G
CREAM TOPICAL
Qty: 45 G | Refills: 2 | Status: SHIPPED | OUTPATIENT
Start: 2023-04-20

## 2023-04-20 NOTE — PATIENT INSTRUCTIONS
- Cetaphil mild cleanser BID  - Benzacline, apply thin coat 3 mornings per week (M,W,F)  - Tretinoin cream 0.025%, apply thin coat to acne 3 nights per week (T,T,S). Discontinue if become pregnant.  - Cerave lotion as needed for dry skin  - Avoid touching face with hands and hair  - Apply tinted broad spectrum sunscreen with minimum SPF 30 every morning to avoid worsening of post-inflammatory hyperpigmentation

## 2023-04-20 NOTE — PROGRESS NOTES
"[facility]    Subjective:      Chief Complaint: Acne F/u , Dandruff, and Medication Refill (Retin-A/Benzadin )    HPI   Dafne Traore is a 32 y.o. female who presents to [facility] for acne follow-up. Reports acne is much better controlled today. Stopped taking doxycycline due to bacterial vaginosis. Needs refills on benzacline and tretinoin cream.     Review of Systems  As per HPI.    Objective:     /73 (BP Location: Right arm, Patient Position: Sitting)   Pulse 91   Temp 98.6 °F (37 °C) (Oral)   Resp 20   Ht 5' 5" (1.651 m)   Wt 83.9 kg (185 lb)   LMP 03/30/2023 (Exact Date)   SpO2 100%   BMI 30.79 kg/m²     Physical Exam:  Gen: No acute distress, well appearing female  Resp: Breathing nonlabored on room air  Skin: Multiple erythematous healing comedones, no pustules. Post-inflammatory hyperpigmentation changes to cheeks with scarring.  MSK: ambulating spontaneously with appropriate ROM in all extremities      Current Outpatient Medications:     clindamycin-benzoyl peroxide (BENZACLIN) gel, Apply a thin coat to acne 3 mornings a week (M, W, F)., Disp: 50 g, Rfl: 1    tretinoin (RETIN-A) 0.025 % cream, Apply thin coat to acne 3 nights a week (Tu, Th, Sat)., Disp: 45 g, Rfl: 1    ferrous sulfate 325 (65 FE) MG EC tablet, Take 1 tablet (325 mg total) by mouth every other day., Disp: 90 tablet, Rfl: 1    linaCLOtide (LINZESS) 72 mcg Cap capsule, Take 1 capsule (72 mcg total) by mouth before breakfast., Disp: 90 capsule, Rfl: 1    sumatriptan (IMITREX) 100 MG tablet, Take 1 tablet at the start of migraine, if migraine persists after 2 hours, please take an additional dose. Max dose 200 mg/day., Disp: 45 tablet, Rfl: 1    valACYclovir (VALTREX) 1000 MG tablet, Take 1,000 mg by mouth every 8 (eight) hours., Disp: , Rfl:      Assessment/Plan:     Acne  Hirsutism  - Cetaphil mild cleanser BID  - Benzacline, apply thin coat 3 mornings per week (M,W,F)  - Tretinoin cream 0.025%, apply thin " coat to acne 3 nights per week (T,T,S). Discontinue if become pregnant.  - Doxycycline discontinued due to bacterial vaginosis  - Cerave lotion as needed for dry skin  - Avoid touching face with hands and hair  - Avoiding spironolactone due to patient currently trying to conceive  - Apply tinted broad spectrum sunscreen with minimum SPF 30 every morning to avoid worsening of post-inflammatory hyperpigmentation    Follow-up: 6 months for acne    Bolivar Mejia MD  [facility] - PGY-1

## 2023-04-21 ENCOUNTER — TELEPHONE (OUTPATIENT)
Dept: FAMILY MEDICINE | Facility: CLINIC | Age: 33
End: 2023-04-21
Payer: COMMERCIAL

## 2023-06-06 NOTE — ASSESSMENT & PLAN NOTE
Encounter Date: 6/6/2023       History     Chief Complaint   Patient presents with    Allergic Reaction     Patient states that she has been 'breaking out in hives' since Friday after eating various foods; unable to pinpoint which food may be causing issue, is taking steroid prescribed by urgent care, did take benadryl this morning, no meds prior to breakout     31 yof presents to ER c/o intermittent hives for 3 days after eating [employer] pizza. Reports has had similar episodes in the past. Currently taking benadryl; and steroids with some improvement. Last steroid dose is tomorrow. Recently has steroid shot at urgent care for same complaint. Dneies SOB and/or throat swelling. Denies new medication and/or fragrances. Has seen allergist. Has PCP appointment scheduled this week.       Review of patient's allergies indicates:  No Known Allergies  No past medical history on file.  No past surgical history on file.  No family history on file.     Review of Systems   Constitutional:  Negative for fever.   HENT:  Negative for drooling, facial swelling, sore throat, trouble swallowing and voice change.    Eyes:  Negative for redness.   Respiratory:  Negative for cough, choking, shortness of breath and stridor.    Cardiovascular:  Negative for chest pain.   Gastrointestinal:  Negative for nausea.   Genitourinary:  Negative for dysuria.   Musculoskeletal:  Negative for back pain.   Skin:  Positive for rash.   Neurological:  Negative for weakness.   Hematological:  Does not bruise/bleed easily.   Psychiatric/Behavioral:  Negative for confusion.      Physical Exam     Initial Vitals [06/06/23 0753]   BP Pulse Resp Temp SpO2   (!) 144/83 70 18 98.6 °F (37 °C) 99 %      MAP       --         Physical Exam    Constitutional: She appears well-developed and well-nourished.   HENT:   Head: Normocephalic.   Eyes: Conjunctivae are normal.   Neck: Neck supple.   Cardiovascular:  Normal rate and regular rhythm.          Lab Results   Component Value Date    [id number] 21.8 (L) 07/29/2022   Educated on increasing foods high in Vitamin D such as fish oil, cod liver oil, salmon, milk fortified with vitamin D.  RX Vitamin D3 27415 IU weekly x 12 weeks.  Complete entire 12 weeks of Vitamin D prescription.  After completion of prescription (12 weeks/3 months), begin taking Vitamin D 2000 I.U. tablets daily (purchase over the counter).  Repeat Vitamin D level as ordered.       Pulmonary/Chest: Breath sounds normal. No stridor. No respiratory distress. She has no wheezes. She has no rhonchi. She has no rales.   Abdominal: Abdomen is soft. She exhibits no distension.   Musculoskeletal:         General: Normal range of motion.      Cervical back: Neck supple.     Neurological: She is alert and oriented to person, place, and time.   Skin: Skin is warm and dry. Capillary refill takes less than 2 seconds. Rash noted.   Urticarial rash to bilateral arms and cheeks   Psychiatric: She has a normal mood and affect.       ED Course   Procedures  Labs Reviewed   HIV 1 / 2 ANTIBODY   HEPATITIS C ANTIBODY   HEP C VIRUS HOLD SPECIMEN          Imaging Results    None          Medications - No data to display  Medical Decision Making:   ED Management:  Patient presents to ER c/o intermittent urticarial rash for 3 days. Reports similar episodes in the past. Has seen allergist. Has PCP appointment this week. Denies new medication and/or fragrances. She is comfortable and in NAD. Breath sounds are clear throughout. Airway is patent. Currently on last dose of oral steroids from urgent care. Also has been taking benadryl. Will give another round of oral steroids and vistaril. Will have her f/u with allergist and PCP.                        Clinical Impression:   Final diagnoses:  [T78.40XA] Allergic reaction, initial encounter (Primary)  [L50.9] Urticaria  [R21] Rash        ED Disposition Condition    Discharge Stable          ED Prescriptions       Medication Sig Dispense Start Date End Date Auth. Provider    hydrOXYzine pamoate (VISTARIL) 25 MG Cap  (Status: Discontinued) Take 1 capsule (25 mg total) by mouth every 6 (six) hours as needed (itching). 30 capsule 6/6/2023 6/6/2023 Di Colindres NP    predniSONE (DELTASONE) 20 MG tablet  (Status: Discontinued) Take 2 tablets (40 mg total) by mouth once daily. for 5 days 10 tablet 6/6/2023 6/6/2023 Di Colindres NP    hydrOXYzine pamoate (VISTARIL) 25  MG Cap Take 1 capsule (25 mg total) by mouth every 6 (six) hours as needed (itching). 30 capsule 6/6/2023 -- Di Colindres NP    predniSONE (DELTASONE) 20 MG tablet Take 2 tablets (40 mg total) by mouth once daily. for 5 days 10 tablet 6/6/2023 6/11/2023 Di Colindres NP          Follow-up Information       Follow up With Specialties Details Why Contact Info    [facility]  Call  As needed              Di Colindres NP  06/06/23 1800

## 2023-07-19 ENCOUNTER — PATIENT MESSAGE (OUTPATIENT)
Dept: RESEARCH | Facility: HOSPITAL | Age: 33
End: 2023-07-19
Payer: COMMERCIAL

## 2023-07-22 DIAGNOSIS — K59.00 CONSTIPATION, UNSPECIFIED CONSTIPATION TYPE: ICD-10-CM

## 2023-07-24 DIAGNOSIS — G43.911 INTRACTABLE MIGRAINE WITH STATUS MIGRAINOSUS, UNSPECIFIED MIGRAINE TYPE: ICD-10-CM

## 2023-07-24 DIAGNOSIS — D50.8 IRON DEFICIENCY ANEMIA SECONDARY TO INADEQUATE DIETARY IRON INTAKE: Primary | ICD-10-CM

## 2023-07-24 NOTE — TELEPHONE ENCOUNTER
Please see the attached refill request.  LOV on 1/18/23. Patient cancelled last appointment through [facility]. And  was never rescheduled. Sent request to  to set appt. Please review.

## 2023-09-05 ENCOUNTER — HOSPITAL ENCOUNTER (EMERGENCY)
Facility: HOSPITAL | Age: 33
Discharge: HOME | End: 2023-09-05
Attending: FAMILY MEDICINE
Payer: COMMERCIAL

## 2023-09-05 VITALS
WEIGHT: 189.81 LBS | OXYGEN SATURATION: 100 % | BODY MASS INDEX: 30.51 KG/M2 | RESPIRATION RATE: 17 BRPM | TEMPERATURE: 98 F | HEART RATE: 88 BPM | DIASTOLIC BLOOD PRESSURE: 78 MMHG | SYSTOLIC BLOOD PRESSURE: 127 MMHG | HEIGHT: 66 IN

## 2023-09-05 DIAGNOSIS — B96.89 BACTERIAL VAGINOSIS: Primary | ICD-10-CM

## 2023-09-05 DIAGNOSIS — N76.0 BACTERIAL VAGINOSIS: Primary | ICD-10-CM

## 2023-09-05 LAB
APPEARANCE UR: CLEAR
B-HCG SERPL QL: NEGATIVE
BACTERIA #/AREA URNS AUTO: ABNORMAL /HPF
BILIRUB UR QL STRIP.AUTO: NEGATIVE
C TRACH DNA SPEC QL NAA+PROBE: NOT DETECTED
CLUE CELLS VAG QL WET PREP: ABNORMAL
COLOR UR: COLORLESS
GLUCOSE UR QL STRIP.AUTO: NORMAL
HYALINE CASTS #/AREA URNS LPF: ABNORMAL /LPF
KETONES UR QL STRIP.AUTO: NEGATIVE
LEUKOCYTE ESTERASE UR QL STRIP.AUTO: NEGATIVE
MUCOUS THREADS URNS QL MICRO: ABNORMAL /LPF
N GONORRHOEA DNA SPEC QL NAA+PROBE: NOT DETECTED
NITRITE UR QL STRIP.AUTO: NEGATIVE
PH UR STRIP.AUTO: 6.5 [PH]
PROT UR QL STRIP.AUTO: NEGATIVE
RBC #/AREA URNS AUTO: ABNORMAL /HPF
RBC UR QL AUTO: NEGATIVE
SOURCE (OHS): NORMAL
SP GR UR STRIP.AUTO: 1.01 (ref 1–1.03)
SQUAMOUS #/AREA URNS LPF: ABNORMAL /HPF
T VAGINALIS VAG QL WET PREP: ABNORMAL
UROBILINOGEN UR STRIP-ACNC: NORMAL
WBC #/AREA URNS AUTO: ABNORMAL /HPF
WBC #/AREA VAG WET PREP: ABNORMAL
YEAST SPEC QL WET PREP: ABNORMAL

## 2023-09-05 PROCEDURE — 87591 N.GONORRHOEAE DNA AMP PROB: CPT | Performed by: STUDENT IN AN ORGANIZED HEALTH CARE EDUCATION/TRAINING PROGRAM

## 2023-09-05 PROCEDURE — 87210 SMEAR WET MOUNT SALINE/INK: CPT | Performed by: STUDENT IN AN ORGANIZED HEALTH CARE EDUCATION/TRAINING PROGRAM

## 2023-09-05 PROCEDURE — 99284 EMERGENCY DEPT VISIT MOD MDM: CPT

## 2023-09-05 PROCEDURE — 81025 URINE PREGNANCY TEST: CPT | Performed by: STUDENT IN AN ORGANIZED HEALTH CARE EDUCATION/TRAINING PROGRAM

## 2023-09-05 PROCEDURE — 81001 URINALYSIS AUTO W/SCOPE: CPT | Performed by: STUDENT IN AN ORGANIZED HEALTH CARE EDUCATION/TRAINING PROGRAM

## 2023-09-05 RX ORDER — METRONIDAZOLE 500 MG/1
500 TABLET ORAL 2 TIMES DAILY
Qty: 14 TABLET | Refills: 0 | Status: SHIPPED | OUTPATIENT
Start: 2023-09-05 | End: 2023-09-12

## 2023-09-05 RX ORDER — FLUCONAZOLE 150 MG/1
150 TABLET ORAL WEEKLY
Qty: 2 TABLET | Refills: 0 | Status: SHIPPED | OUTPATIENT
Start: 2023-09-05 | End: 2023-09-13

## 2023-09-06 NOTE — ED PROVIDER NOTES
Encounter Date: 9/5/2023       History     Chief Complaint   Patient presents with    Vaginal Discharge     States yellow vaginal discharge x 2 days.         Patient is a 33-year-old gentleman presents emergency room for evaluation with complaints of vaginal discharge has been ongoing for the last 2 days.  Denies abdominal pain.  Denies nausea or vomiting.    The history is provided by the patient.     Review of patient's allergies indicates:  No Known Allergies  Past Medical History:   Diagnosis Date    Anemia     Constipation      History reviewed. No pertinent surgical history.  Family History   Problem Relation Age of Onset    Diabetes Mother     Stroke Father      Social History     Tobacco Use    Smoking status: Never    Smokeless tobacco: Never   Substance Use Topics    Alcohol use: Yes    Drug use: Never     Review of Systems   Constitutional:  Negative for chills, fatigue and fever.   HENT:  Negative for ear pain, rhinorrhea and sore throat.    Eyes:  Negative for photophobia and pain.   Respiratory:  Negative for cough, shortness of breath and wheezing.    Cardiovascular:  Negative for chest pain.   Gastrointestinal:  Negative for abdominal pain, diarrhea, nausea and vomiting.   Genitourinary:  Positive for vaginal discharge. Negative for dysuria.   Neurological:  Negative for dizziness, weakness and headaches.   All other systems reviewed and are negative.      Physical Exam     Initial Vitals [09/05/23 2044]   BP Pulse Resp Temp SpO2   127/78 90 17 97.9 °F (36.6 °C) 100 %      MAP       --         Physical Exam    Nursing note and vitals reviewed.  Constitutional: She appears well-developed and well-nourished. No distress.   HENT:   Head: Normocephalic and atraumatic.   Eyes: Conjunctivae and EOM are normal. Pupils are equal, round, and reactive to light.   Neck: Neck supple.   Normal range of motion.  Cardiovascular:  Normal rate, regular rhythm, normal heart sounds and intact distal pulses.            Pulmonary/Chest: Breath sounds normal. No respiratory distress. She has no wheezes. She has no rhonchi. She has no rales.   Abdominal: Abdomen is soft. Bowel sounds are normal. She exhibits no distension. There is no abdominal tenderness. There is no rebound and no guarding.   Musculoskeletal:         General: Normal range of motion.      Cervical back: Normal range of motion and neck supple.     Neurological: She is alert and oriented to person, place, and time.   Skin: Skin is warm and dry. Capillary refill takes less than 2 seconds. No erythema.   Psychiatric: She has a normal mood and affect. Her behavior is normal. Judgment and thought content normal.         ED Course   Procedures  Labs Reviewed   WET PREP, GENITAL - Abnormal; Notable for the following components:       Result Value    WBC, Wet Prep Few (*)     Clue Cells, Wet Prep Moderate (*)     All other components within normal limits   URINALYSIS, REFLEX TO URINE CULTURE - Abnormal; Notable for the following components:    Color, UA Colorless (*)     Squamous Epithelial Cells, UA Occ (*)     Mucous, UA Trace (*)     All other components within normal limits   PREGNANCY TEST, URINE RAPID - Normal   CHLAMYDIA/GONORRHOEAE(GC), PCR    Narrative:     The Xpert CT/NG test, performed on the GeneXpert system is a qualitative in vitro real-time polymerase chain reaction (PCR) test for the automated detected and differentiation for genomic DNA from Chlamydia trachomatis (CT) and/or Neisseria gonorrhoeae (NG).          Imaging Results    None          Medications - No data to display  Medical Decision Making    33-year-old female presents emergency room complaints of vaginal discharge ongoing last 2 days, denies any new partners.  Will obtain a wet prep, along with a gonorrhea chlamydia PCR.  Will continue to monitor.    Amount and/or Complexity of Data Reviewed  Labs:  Decision-making details documented in ED Course.    Risk  Prescription drug management.                ED Course as of 09/05/23 2324   Tue Sep 05, 2023   2304 Source: Urine, Clean Catch [MW]   2304 N. gonorrhea PCR: Not Detected [MW]   2304 Chlamydia trachomatis PCR: Not Detected [MW]      ED Course User Index  [MW] Elliott Conroy MD                    Clinical Impression:   Final diagnoses:  [N76.0, B96.89] Bacterial vaginosis (Primary)        ED Disposition Condition    Discharge Stable          ED Prescriptions       Medication Sig Dispense Start Date End Date Auth. Provider    metroNIDAZOLE (FLAGYL) 500 MG tablet Take 1 tablet (500 mg total) by mouth 2 (two) times a day. for 7 days 14 tablet 9/5/2023 9/12/2023 Elliott Conroy MD    fluconazole (DIFLUCAN) 150 MG Tab Take 1 tablet (150 mg total) by mouth once a week. for 2 doses 2 tablet 9/5/2023 9/13/2023 Elliott Conroy MD          Follow-up Information       Follow up With Specialties Details Why Contact Info    [facility]   [address] [facility] [address]  [phone]      [facility] Emergency [facility]  As needed, If symptoms worsen [address] [facility] [address] [phone]             Elliott Conroy MD  09/05/23 2312       Elliott Conroy MD  09/05/23 2324

## 2024-04-29 DIAGNOSIS — L70.9 ACNE, UNSPECIFIED ACNE TYPE: ICD-10-CM

## 2024-04-29 RX ORDER — TRETINOIN 0.25 MG/G
CREAM TOPICAL
Qty: 45 G | Refills: 2 | OUTPATIENT
Start: 2024-04-29

## 2024-06-20 ENCOUNTER — OFFICE VISIT (OUTPATIENT)
Dept: FAMILY MEDICINE | Facility: CLINIC | Age: 34
End: 2024-06-20
Payer: COMMERCIAL

## 2024-06-20 VITALS
DIASTOLIC BLOOD PRESSURE: 77 MMHG | BODY MASS INDEX: 29.49 KG/M2 | SYSTOLIC BLOOD PRESSURE: 111 MMHG | RESPIRATION RATE: 20 BRPM | TEMPERATURE: 99 F | HEART RATE: 94 BPM | OXYGEN SATURATION: 98 % | WEIGHT: 177 LBS | HEIGHT: 65 IN

## 2024-06-20 DIAGNOSIS — L70.9 ACNE, UNSPECIFIED ACNE TYPE: ICD-10-CM

## 2024-06-20 DIAGNOSIS — L81.0 POST-INFLAMMATORY HYPERPIGMENTATION: Primary | ICD-10-CM

## 2024-06-20 PROCEDURE — 99214 OFFICE O/P EST MOD 30 MIN: CPT | Mod: PBBFAC | Performed by: DERMATOLOGY

## 2024-06-20 RX ORDER — TRETINOIN 0.5 MG/G
CREAM TOPICAL NIGHTLY
Qty: 45 G | Refills: 0 | Status: SHIPPED | OUTPATIENT
Start: 2024-06-20

## 2024-06-20 RX ORDER — CLINDAMYCIN AND BENZOYL PEROXIDE 10; 50 MG/G; MG/G
GEL TOPICAL
Qty: 50 G | Refills: 2 | Status: SHIPPED | OUTPATIENT
Start: 2024-06-20

## 2024-06-20 NOTE — PATIENT INSTRUCTIONS
Sunscreen: must be 30 spf or higher, broad spectrum, and tinted.    - Post-inflammatory hyperpigmentation  - papulopustular acne  - [place]

## 2024-06-20 NOTE — PROGRESS NOTES
"[facility]  Dermatology Office Visit Note    Subjective   Dafne Traore  [id number]  06/20/2024    Chief Complaint: Medication Refill (tretinoin (RETIN-A) 0.025 % cream)    Dafne Traore is a 33 y.o. female  presenting to [facility] for acne and hyperpigmentation.    Last seen 4/20/24  Only using retin-A cream since last visit. One -2 times daily depending on her skin. Today concerned with hyperpigmented areas of the face. Using daily moisturized and cleanser.         Review of Systems   Constitutional:  Negative for activity change, appetite change, fatigue and fever.   Skin:  Positive for color change. Negative for rash and wound.       Objective    Vitals:    06/20/24 0738   BP: 111/77   BP Location: Right arm   Patient Position: Sitting   BP Method: Medium (Automatic)   Pulse: 94   Resp: 20   Temp: 98.6 °F (37 °C)   TempSrc: Oral   SpO2: 98%   Weight: 80.3 kg (177 lb)   Height: 5' 5" (1.651 m)        Physical Exam  Constitutional:       General: She is not in acute distress.     Appearance: She is not ill-appearing.   Pulmonary:      Effort: Pulmonary effort is normal.   Skin:     General: Skin is warm and dry.      Comments: Mild small papulopustular acne w/ some comedones and PIH               Current Medications:   Current Outpatient Medications   Medication Sig Dispense Refill    clindamycin-benzoyl peroxide (BENZACLIN) gel Apply a thin coat to acne 3 mornings a week (M, W, F). 50 g 2    linaCLOtide (LINZESS) 72 mcg Cap capsule Take 1 capsule (72 mcg total) by mouth before breakfast. 90 capsule 1    sumatriptan (IMITREX) 100 MG tablet Take 1 tablet at the start of migraine, if migraine persists after 2 hours, please take an additional dose. Max dose 200 mg/day. 45 tablet 1    tretinoin (RETIN-A) 0.05 % cream Apply topically every evening. Active acne areas and areas of hyperpigmentation nightly. 45 g 0    valACYclovir (VALTREX) 1000 MG tablet Take 1,000 mg by mouth every 8 (eight) hours.   "     No current facility-administered medications for this visit.       Assessment & Plan:  1. Post-inflammatory hyperpigmentation    2. Acne, unspecified acne type        Orders Placed This Encounter    tretinoin (RETIN-A) 0.05 % cream    clindamycin-benzoyl peroxide (BENZACLIN) gel       Increase to tretinoin cream 0.05% nightly.  Recommend using Benzaclin w/ pustular break outs  Emphasized importance of Sunscreen use specifically: 30 spf or higher, broad spectrum, and tinted.   Sun avoidance instructions given.     RTC in 6 months      Jacquelyn Mace M.D.  [facility]

## 2024-06-20 NOTE — LETTER
June 20, 2024    Dafne Traore  [address] [place] [address]             [facility]  Family Medicine  [address]  [place] [address]  Phone: [phone]   June 20, 2024     Patient: Dafne Traore   YOB: 1990   Date of Visit: 6/20/2024       To Whom it May Concern:    Dafne Traore was seen in my clinic on 6/20/2024. She may return to work on 6/20/2024 .    Please excuse her from any work missed.    If you have any questions or concerns, please don't hesitate to call.    Sincerely,         Major Cohen MD

## 2024-08-16 ENCOUNTER — TELEPHONE (OUTPATIENT)
Dept: INTERNAL MEDICINE | Facility: CLINIC | Age: 34
End: 2024-08-16
Payer: COMMERCIAL

## 2024-08-16 DIAGNOSIS — Z00.00 WELL ADULT EXAM: ICD-10-CM

## 2024-08-16 DIAGNOSIS — G43.909 MIGRAINE WITHOUT STATUS MIGRAINOSUS, NOT INTRACTABLE, UNSPECIFIED MIGRAINE TYPE: Primary | ICD-10-CM

## 2024-08-16 DIAGNOSIS — Z13.29 SCREENING FOR THYROID DISORDER: ICD-10-CM

## 2024-08-16 DIAGNOSIS — E55.9 VITAMIN D DEFICIENCY: ICD-10-CM

## 2024-08-16 DIAGNOSIS — D50.8 IRON DEFICIENCY ANEMIA SECONDARY TO INADEQUATE DIETARY IRON INTAKE: ICD-10-CM

## 2024-08-16 DIAGNOSIS — L68.0 FEMALE HIRSUTISM: ICD-10-CM

## 2024-08-16 DIAGNOSIS — Z13.220 SCREENING FOR LIPID DISORDERS: ICD-10-CM

## 2024-08-18 NOTE — TELEPHONE ENCOUNTER
----- Message from Binta Barnett sent at 8/16/2024  8:18 AM CDT -----  Regarding: labs  Patient has appointment 09/18/24, requesting labs.  
Lab orders placed in anticipation of upcoming appt.   
Pt requesting lab orders for upcoming appt. Please advise.    LOV:1/18/23    NOV: 9/18/24    
Spine appears normal, range of motion is not limited, no muscle or joint tenderness

## 2024-09-18 ENCOUNTER — OFFICE VISIT (OUTPATIENT)
Dept: INTERNAL MEDICINE | Facility: CLINIC | Age: 34
End: 2024-09-18
Payer: COMMERCIAL

## 2024-09-18 ENCOUNTER — LAB VISIT (OUTPATIENT)
Dept: LAB | Facility: HOSPITAL | Age: 34
End: 2024-09-18
Payer: COMMERCIAL

## 2024-09-18 VITALS
OXYGEN SATURATION: 100 % | HEART RATE: 79 BPM | RESPIRATION RATE: 18 BRPM | DIASTOLIC BLOOD PRESSURE: 74 MMHG | TEMPERATURE: 98 F | HEIGHT: 64 IN | WEIGHT: 179.81 LBS | SYSTOLIC BLOOD PRESSURE: 114 MMHG | BODY MASS INDEX: 30.7 KG/M2

## 2024-09-18 DIAGNOSIS — Z13.1 SCREENING FOR DIABETES MELLITUS: ICD-10-CM

## 2024-09-18 DIAGNOSIS — Z13.29 SCREENING FOR THYROID DISORDER: ICD-10-CM

## 2024-09-18 DIAGNOSIS — Z11.3 ROUTINE SCREENING FOR STI (SEXUALLY TRANSMITTED INFECTION): ICD-10-CM

## 2024-09-18 DIAGNOSIS — G43.909 MIGRAINE WITHOUT STATUS MIGRAINOSUS, NOT INTRACTABLE, UNSPECIFIED MIGRAINE TYPE: ICD-10-CM

## 2024-09-18 DIAGNOSIS — D50.8 IRON DEFICIENCY ANEMIA SECONDARY TO INADEQUATE DIETARY IRON INTAKE: ICD-10-CM

## 2024-09-18 DIAGNOSIS — Z13.220 SCREENING FOR LIPID DISORDERS: ICD-10-CM

## 2024-09-18 DIAGNOSIS — L68.0 FEMALE HIRSUTISM: ICD-10-CM

## 2024-09-18 DIAGNOSIS — Z00.00 WELL ADULT EXAM: ICD-10-CM

## 2024-09-18 DIAGNOSIS — E66.09 CLASS 1 OBESITY DUE TO EXCESS CALORIES WITHOUT SERIOUS COMORBIDITY WITH BODY MASS INDEX (BMI) OF 30.0 TO 30.9 IN ADULT: ICD-10-CM

## 2024-09-18 DIAGNOSIS — E55.9 VITAMIN D DEFICIENCY: ICD-10-CM

## 2024-09-18 DIAGNOSIS — Z00.00 WELL ADULT EXAM: Primary | ICD-10-CM

## 2024-09-18 PROBLEM — E66.811 CLASS 1 OBESITY DUE TO EXCESS CALORIES WITHOUT SERIOUS COMORBIDITY WITH BODY MASS INDEX (BMI) OF 30.0 TO 30.9 IN ADULT: Status: ACTIVE | Noted: 2024-09-18

## 2024-09-18 LAB
25(OH)D3+25(OH)D2 SERPL-MCNC: 23 NG/ML (ref 30–80)
ALBUMIN SERPL-MCNC: 3.7 G/DL (ref 3.5–5)
ALBUMIN/GLOB SERPL: 0.9 RATIO (ref 1.1–2)
ALP SERPL-CCNC: 62 UNIT/L (ref 40–150)
ALT SERPL-CCNC: 16 UNIT/L (ref 0–55)
ANION GAP SERPL CALC-SCNC: 5 MEQ/L
AST SERPL-CCNC: 20 UNIT/L (ref 5–34)
BACTERIA #/AREA URNS AUTO: ABNORMAL /HPF
BASOPHILS # BLD AUTO: 0.05 X10(3)/MCL
BASOPHILS NFR BLD AUTO: 1.1 %
BILIRUB SERPL-MCNC: 0.3 MG/DL
BILIRUB UR QL STRIP.AUTO: NEGATIVE
BUN SERPL-MCNC: 8 MG/DL (ref 7–18.7)
C TRACH DNA SPEC QL NAA+PROBE: NOT DETECTED
CALCIUM SERPL-MCNC: 9.1 MG/DL (ref 8.4–10.2)
CHLORIDE SERPL-SCNC: 112 MMOL/L (ref 98–107)
CHOLEST SERPL-MCNC: 152 MG/DL
CHOLEST/HDLC SERPL: 3 {RATIO} (ref 0–5)
CLARITY UR: CLEAR
CO2 SERPL-SCNC: 23 MMOL/L (ref 22–29)
COLOR UR AUTO: ABNORMAL
CREAT SERPL-MCNC: 0.86 MG/DL (ref 0.55–1.02)
CREAT UR-MCNC: 234.1 MG/DL (ref 45–106)
CREAT/UREA NIT SERPL: 9
EOSINOPHIL # BLD AUTO: 0.04 X10(3)/MCL (ref 0–0.9)
EOSINOPHIL NFR BLD AUTO: 0.9 %
ERYTHROCYTE [DISTWIDTH] IN BLOOD BY AUTOMATED COUNT: 19.5 % (ref 11.5–17)
EST. AVERAGE GLUCOSE BLD GHB EST-MCNC: 116.9 MG/DL
FERRITIN SERPL-MCNC: 5.63 NG/ML (ref 4.63–204)
GFR SERPLBLD CREATININE-BSD FMLA CKD-EPI: >60 ML/MIN/1.73/M2
GLOBULIN SER-MCNC: 4.2 GM/DL (ref 2.4–3.5)
GLUCOSE SERPL-MCNC: 102 MG/DL (ref 74–100)
GLUCOSE UR QL STRIP: NORMAL
HBA1C MFR BLD: 5.7 %
HCT VFR BLD AUTO: 31 % (ref 37–47)
HDLC SERPL-MCNC: 56 MG/DL (ref 35–60)
HGB BLD-MCNC: 9 G/DL (ref 12–16)
HGB UR QL STRIP: NEGATIVE
HYALINE CASTS #/AREA URNS LPF: ABNORMAL /LPF
IMM GRANULOCYTES # BLD AUTO: 0.01 X10(3)/MCL (ref 0–0.04)
IMM GRANULOCYTES NFR BLD AUTO: 0.2 %
IRON SATN MFR SERPL: 5 % (ref 20–50)
IRON SERPL-MCNC: 18 UG/DL (ref 50–170)
KETONES UR QL STRIP: NEGATIVE
LDLC SERPL CALC-MCNC: 85 MG/DL (ref 50–140)
LEUKOCYTE ESTERASE UR QL STRIP: NEGATIVE
LYMPHOCYTES # BLD AUTO: 1.83 X10(3)/MCL (ref 0.6–4.6)
LYMPHOCYTES NFR BLD AUTO: 40.1 %
MCH RBC QN AUTO: 17.7 PG (ref 27–31)
MCHC RBC AUTO-ENTMCNC: 29 G/DL (ref 33–36)
MCV RBC AUTO: 60.9 FL (ref 80–94)
MICROALBUMIN UR-MCNC: 9.7 UG/ML
MICROALBUMIN/CREAT RATIO PNL UR: 4.1 MG/GM CR (ref 0–30)
MONOCYTES # BLD AUTO: 0.37 X10(3)/MCL (ref 0.1–1.3)
MONOCYTES NFR BLD AUTO: 8.1 %
MUCOUS THREADS URNS QL MICRO: ABNORMAL /LPF
N GONORRHOEA DNA SPEC QL NAA+PROBE: NOT DETECTED
NEUTROPHILS # BLD AUTO: 2.26 X10(3)/MCL (ref 2.1–9.2)
NEUTROPHILS NFR BLD AUTO: 49.6 %
NITRITE UR QL STRIP: NEGATIVE
NRBC BLD AUTO-RTO: 0 %
PH UR STRIP: 6 [PH]
PLATELET # BLD AUTO: 449 X10(3)/MCL (ref 130–400)
PMV BLD AUTO: 9.1 FL (ref 7.4–10.4)
POTASSIUM SERPL-SCNC: 4.1 MMOL/L (ref 3.5–5.1)
PROT SERPL-MCNC: 7.9 GM/DL (ref 6.4–8.3)
PROT UR QL STRIP: ABNORMAL
RBC # BLD AUTO: 5.09 X10(6)/MCL (ref 4.2–5.4)
RBC #/AREA URNS AUTO: ABNORMAL /HPF
SODIUM SERPL-SCNC: 140 MMOL/L (ref 136–145)
SOURCE (OHS): NORMAL
SP GR UR STRIP.AUTO: 1.03 (ref 1–1.03)
SQUAMOUS #/AREA URNS LPF: ABNORMAL /HPF
T4 FREE SERPL-MCNC: 0.91 NG/DL (ref 0.7–1.48)
TIBC SERPL-MCNC: 378 UG/DL (ref 70–310)
TIBC SERPL-MCNC: 396 UG/DL (ref 250–450)
TRANSFERRIN SERPL-MCNC: 381 MG/DL (ref 180–382)
TRIGL SERPL-MCNC: 57 MG/DL (ref 37–140)
TSH SERPL-ACNC: 2.09 UIU/ML (ref 0.35–4.94)
UROBILINOGEN UR STRIP-ACNC: NORMAL
VLDLC SERPL CALC-MCNC: 11 MG/DL
WBC # BLD AUTO: 4.56 X10(3)/MCL (ref 4.5–11.5)
WBC #/AREA URNS AUTO: ABNORMAL /HPF

## 2024-09-18 PROCEDURE — 1159F MED LIST DOCD IN RCRD: CPT | Mod: CPTII,,,

## 2024-09-18 PROCEDURE — 36415 COLL VENOUS BLD VENIPUNCTURE: CPT

## 2024-09-18 PROCEDURE — 3008F BODY MASS INDEX DOCD: CPT | Mod: CPTII,,,

## 2024-09-18 PROCEDURE — 83540 ASSAY OF IRON: CPT

## 2024-09-18 PROCEDURE — 82728 ASSAY OF FERRITIN: CPT

## 2024-09-18 PROCEDURE — 82570 ASSAY OF URINE CREATININE: CPT

## 2024-09-18 PROCEDURE — 3074F SYST BP LT 130 MM HG: CPT | Mod: CPTII,,,

## 2024-09-18 PROCEDURE — 99214 OFFICE O/P EST MOD 30 MIN: CPT | Mod: 25,S$PBB,,

## 2024-09-18 PROCEDURE — 80061 LIPID PANEL: CPT

## 2024-09-18 PROCEDURE — 80053 COMPREHEN METABOLIC PANEL: CPT

## 2024-09-18 PROCEDURE — 84443 ASSAY THYROID STIM HORMONE: CPT

## 2024-09-18 PROCEDURE — 87491 CHLMYD TRACH DNA AMP PROBE: CPT

## 2024-09-18 PROCEDURE — 99395 PREV VISIT EST AGE 18-39: CPT | Mod: S$PBB,,,

## 2024-09-18 PROCEDURE — 82306 VITAMIN D 25 HYDROXY: CPT

## 2024-09-18 PROCEDURE — 3078F DIAST BP <80 MM HG: CPT | Mod: CPTII,,,

## 2024-09-18 PROCEDURE — 1160F RVW MEDS BY RX/DR IN RCRD: CPT | Mod: CPTII,,,

## 2024-09-18 PROCEDURE — 85025 COMPLETE CBC W/AUTO DIFF WBC: CPT

## 2024-09-18 PROCEDURE — 83036 HEMOGLOBIN GLYCOSYLATED A1C: CPT

## 2024-09-18 PROCEDURE — 81001 URINALYSIS AUTO W/SCOPE: CPT

## 2024-09-18 PROCEDURE — 84439 ASSAY OF FREE THYROXINE: CPT

## 2024-09-18 PROCEDURE — 81015 MICROSCOPIC EXAM OF URINE: CPT

## 2024-09-18 PROCEDURE — 3044F HG A1C LEVEL LT 7.0%: CPT | Mod: CPTII,,,

## 2024-09-18 PROCEDURE — 99214 OFFICE O/P EST MOD 30 MIN: CPT | Mod: PBBFAC

## 2024-09-18 NOTE — PATIENT INSTRUCTIONS
REMINDER: Please complete labs within 1 week of appointment.   Please complete satisfaction survey when received. Thank you.    Mohinder Leos,     If you are due for any health screening(s) below please notify me so we can arrange them to be ordered and scheduled. Most healthy patients at your age complete them, but you are free to accept or refuse.     If you can't do it, I'll definitely understand. If you can, I'd certainly appreciate it!    All of your core healthy metrics are met.

## 2024-09-18 NOTE — ASSESSMENT & PLAN NOTE
Lab Results   Component Value Date    HCT 31.0 (L) 09/18/2024    HGB 9.0 (L) 09/18/2024    FERRITIN 5.63 09/18/2024    TIBC 396 09/18/2024    LABIRON 5 (L) 09/18/2024   Rx ferrous sulfate.  Take iron supplements as prescribed.  Add Vitamin C to your diet.  Take iron and vitamin C on an empty stomach for better absorption if tolerated.  Add iron rich foods to diet such as liver, lean beef, eggs, dried fruit, dark green leafy vegetables.  Education provided on additional sources of potassium-rich foods.  Do NOT drink milk or take antacids at the same time you take your iron supplement.  Iron supplements can cause constipation; add stool softener or fiber as needed.  Repeat iron studies in 3 months.

## 2024-09-18 NOTE — PROGRESS NOTES
PATIENT NAME: Dafne Traore  : 1990  DATE: 24  MRN: [id number]          Reason for Visit/Chief Complaint   Annual Exam       History of Present Illness (HPI)     Dafne Traore is a 34 y.o. female presenting in clinic today for an Annual Exam, denies any acute complaints. PMH: migraine headaches, constipation.     Denies smoking, drinking, or illicit drug use. Denies chest pain, shortness of breath, cough, headache, dizziness, weakness, abdominal pain, nausea, vomiting, diarrhea, constipation, dysuria, depression, anxiety, SI, and HI.    Cervical Cancer Screening - Last Pap on  at [facility]. Follow up with GYN Clinic for annual Pap/Pelvic.  Breast Cancer Screening -Deferred due to age.   Colon Cancer Screening - Deferred due to age.   Osteoporosis Screening -Deferred due to age.   Vaccinations: Flu - Not currently being offered, recommended annually. / Tetanus - 2021 /COVID - 2021 ([facility]).     **Addendum:  Patient completed labs after visit - notified of results.     Review of Systems     Review of Systems   Constitutional: Negative.    HENT: Negative.     Eyes: Negative.    Respiratory: Negative.     Cardiovascular: Negative.    Gastrointestinal: Negative.  Negative for constipation.   Endocrine: Negative.    Genitourinary: Negative.  Negative for vaginal discharge.   Musculoskeletal: Negative.    Skin: Negative.    Allergic/Immunologic: Negative.    Neurological:  Negative for dizziness and headaches.   Hematological: Negative.    Psychiatric/Behavioral: Negative.     All other systems reviewed and are negative.      Medical / Social / Family History     Past Medical History:   Diagnosis Date    Anemia     Constipation      History reviewed. No pertinent surgical history.      Social History  Dafne Traore's  reports that she has never smoked. She has never used smokeless tobacco. She reports current alcohol use. She reports that she does not use drugs.    Family History  Dafne  "León's family history includes Diabetes in her mother; Stroke in her father.    Medications and Allergies     Medications  Current Outpatient Medications   Medication Instructions    cholecalciferol (vitamin D3) 50,000 Units, Oral, Every 7 days    ferrous gluconate (FERGON) 324 mg, Oral, With breakfast    sumatriptan (IMITREX) 100 MG tablet Take 1 tablet at the start of migraine, if migraine persists after 2 hours, please take an additional dose. Max dose 200 mg/day.    tretinoin (RETIN-A) 0.05 % cream Topical (Top), Nightly, Active acne areas and areas of hyperpigmentation nightly.           Allergies  Review of patient's allergies indicates:  No Known Allergies    Physical Examination   Visit Vitals  /74 (BP Location: Left arm, Patient Position: Sitting, BP Method: Large (Automatic))   Pulse 79   Temp 98.4 °F (36.9 °C) (Oral)   Resp 18   Ht 5' 4" (1.626 m)   Wt 81.6 kg (179 lb 12.8 oz)   SpO2 100%   BMI 30.86 kg/m²       Physical Exam  Vitals reviewed.   Constitutional:       Appearance: Normal appearance. She is normal weight.   HENT:      Head: Normocephalic and atraumatic.      Right Ear: External ear normal.      Left Ear: External ear normal.      Nose: Nose normal.      Mouth/Throat:      Mouth: Mucous membranes are moist.      Pharynx: Oropharynx is clear.   Eyes:      Extraocular Movements: Extraocular movements intact.      Conjunctiva/sclera: Conjunctivae normal.      Pupils: Pupils are equal, round, and reactive to light.   Cardiovascular:      Rate and Rhythm: Normal rate and regular rhythm.      Pulses: Normal pulses.      Heart sounds: Normal heart sounds.   Pulmonary:      Effort: Pulmonary effort is normal.      Breath sounds: Normal breath sounds.   Abdominal:      General: Bowel sounds are normal.      Palpations: Abdomen is soft.   Musculoskeletal:         General: Normal range of motion.      Cervical back: Normal range of motion and neck supple.   Skin:     General: Skin is warm and " dry.      Capillary Refill: Capillary refill takes less than 2 seconds.   Neurological:      General: No focal deficit present.      Mental Status: She is alert and oriented to person, place, and time.   Psychiatric:         Mood and Affect: Mood normal.         Behavior: Behavior normal.         Thought Content: Thought content normal.         Judgment: Judgment normal.           Results     Lab Results   Component Value Date    WBC 4.56 09/18/2024    RBC 5.09 09/18/2024    HGB 9.0 (L) 09/18/2024    HCT 31.0 (L) 09/18/2024    MCV 60.9 (L) 09/18/2024    MCH 17.7 (L) 09/18/2024    MCHC 29.0 (L) 09/18/2024    RDW 19.5 (H) 09/18/2024     (H) 09/18/2024    MPV 9.1 09/18/2024      Lab Results   Component Value Date     09/18/2024    K 4.1 09/18/2024    CO2 23 09/18/2024    GLUCOSE 102 (H) 09/18/2024    BUN 8.0 09/18/2024    CREATININE 0.86 09/18/2024    LABPROT 7.9 09/18/2024    ALBUMIN 3.7 09/18/2024    BILITOT 0.3 09/18/2024    ALKPHOS 62 09/18/2024    AST 20 09/18/2024    ALT 16 09/18/2024    AGAP 5.0 09/18/2024    EGFRNORACEVR >60 09/18/2024     Lab Results   Component Value Date    TSH 2.087 09/18/2024     Lab Results   Component Value Date    CHOL 152 09/18/2024    HDL 56 09/18/2024    LDL 85.00 09/18/2024    TRIG 57 09/18/2024     Lab Results   Component Value Date    SGUA 1.027 09/18/2024    PROTEINUA Trace (A) 09/18/2024    BILIRUBINUA Negative 09/18/2024    WBCUA 0-5 09/18/2024    RBCUA 0-5 09/18/2024    BACTERIA Trace (A) 09/18/2024    LEUKOCYTESUR Negative 09/18/2024    UROBILINOGEN Normal 09/18/2024     Lab Results   Component Value Date    CREATRANDUR 234.1 (H) 09/18/2024    MICALBCREAT 4.1 09/18/2024     Lab Results   Component Value Date    [id number] 23 (L) 09/18/2024     Lab Results   Component Value Date    HIV Nonreactive 07/29/2022    HEPAIGM Nonreactive 07/29/2022    HEPBSAG Nonreactive 07/29/2022    HEPCAB Nonreactive 07/29/2022       Assessment and Plan (including Health  Maintenance)     Problem List Items Addressed This Visit          Oncology    Iron deficiency anemia secondary to inadequate dietary iron intake    Current Assessment & Plan     Lab Results   Component Value Date    HCT 31.0 (L) 09/18/2024    HGB 9.0 (L) 09/18/2024    FERRITIN 5.63 09/18/2024    TIBC 396 09/18/2024    LABIRON 5 (L) 09/18/2024   Rx ferrous sulfate.  Take iron supplements as prescribed.  Add Vitamin C to your diet.  Take iron and vitamin C on an empty stomach for better absorption if tolerated.  Add iron rich foods to diet such as liver, lean beef, eggs, dried fruit, dark green leafy vegetables.  Education provided on additional sources of potassium-rich foods.  Do NOT drink milk or take antacids at the same time you take your iron supplement.  Iron supplements can cause constipation; add stool softener or fiber as needed.           Relevant Medications    ferrous gluconate (FERGON) 324 MG tablet    Other Relevant Orders    Iron and TIBC    CBC Auto Differential    Iron and TIBC    Ferritin    Folate    Vitamin B12       Endocrine    Vitamin D deficiency    Current Assessment & Plan     Lab Results   Component Value Date    [id number] 23 (L) 09/18/2024     Educated on increasing foods high in Vitamin D such as fish oil, cod liver oil, salmon, milk fortified with vitamin D.  Rx Vitamin D3 96646 iu weekly.  Once complete with weekly vitamin D, take vitamin D 2000 iu daily - purchase OTC.  Repeat Vitamin D level as ordered.             Relevant Medications    cholecalciferol, vitamin D3, 1,250 mcg (50,000 unit) capsule    Other Relevant Orders    Vitamin D    Class 1 obesity due to excess calories without serious comorbidity with body mass index (BMI) of 30.0 to 30.9 in adult    Current Assessment & Plan     Body mass index is 30.86 kg/m².  Goal BMI <30.  Aerobic exercise 150 minutes per week.  Avoid soda, simple sugars, sweets, excessive rice, pasta, potatoes or bread.   Choose brown options when  available and portion control.  Limit fast foods and fried foods.   Choose complex carbs in moderation (ex: green, leafy vegetables, beans, oatmeal).  Eat plenty of fresh fruits and vegetables with lean meats daily.   Consider permanent healthy lifestyle changes.             Other    Well adult exam - Primary    Current Assessment & Plan     Wellness labs - 9/18/2024.   Cervical Cancer Screening - Last Pap on 2021 at [facility]. Follow up with GYN Clinic for annual Pap/Pelvic.  Breast Cancer Screening -Deferred due to age.   Colon Cancer Screening - Deferred due to age.   Osteoporosis Screening -Deferred due to age.   Vaccinations: Flu - Not currently being offered, recommended annually. / Tetanus - 5/20/2021 /COVID - 7/7/2021 ([facility]).         Relevant Orders    Urinalysis, Reflex to Urine Culture (Completed)    Microalbumin/Creatinine Ratio, Urine    Comprehensive Metabolic Panel    CBC Auto Differential     Other Visit Diagnoses       Screening for thyroid disorder        Relevant Orders    TSH    T4, Free    Screening for lipid disorders        Relevant Orders    Lipid Panel    Routine screening for STI (sexually transmitted infection)        Relevant Orders    Chlamydia/GC, PCR (Completed)    Screening for diabetes mellitus        Relevant Orders    Hemoglobin A1C               Health Maintenance Due   Topic Date Due    Influenza Vaccine (1) Never done    COVID-19 Vaccine (2 - 2023-24 season) 09/01/2024       Health Maintenance Topics with due status: Not Due       Topic Last Completion Date    TETANUS VACCINE 05/20/2021    Cervical Cancer Screening 04/19/2023       Future Appointments   Date Time Provider Department Center   9/26/2024  2:45 PM Adali Bay MD [facility] [place]   7/8/2025  2:15 PM Toribio Meeks MD [facility]   9/18/2025  7:00 AM Daniella Lopez [facility]      Follow up in about 3 months (around 12/18/2024) for Follow up, Med check, Lab review, RTC PRN,  Virtual Visit, Anemia.         Signature:        JAH Simpson  [facility]  [address] [id number]    Date of encounter: 9/18/24

## 2024-09-23 RX ORDER — FERROUS GLUCONATE 324(38)MG
324 TABLET ORAL
Qty: 90 TABLET | Refills: 2 | Status: SHIPPED | OUTPATIENT
Start: 2024-09-23 | End: 2025-09-23

## 2024-09-23 RX ORDER — ASPIRIN 325 MG
50000 TABLET, DELAYED RELEASE (ENTERIC COATED) ORAL
Qty: 12 CAPSULE | Refills: 1 | Status: SHIPPED | OUTPATIENT
Start: 2024-09-23 | End: 2025-03-22

## 2024-09-23 NOTE — ASSESSMENT & PLAN NOTE
Lab Results   Component Value Date    [id number] 23 (L) 09/18/2024     Educated on increasing foods high in Vitamin D such as fish oil, cod liver oil, salmon, milk fortified with vitamin D.  Rx Vitamin D3 27824 iu weekly.  Once complete with weekly vitamin D, take vitamin D 2000 iu daily - purchase OTC.  Repeat Vitamin D level as ordered.

## 2024-09-23 NOTE — ASSESSMENT & PLAN NOTE
Body mass index is 30.86 kg/m².  Goal BMI <30.  Aerobic exercise 150 minutes per week.  Avoid soda, simple sugars, sweets, excessive rice, pasta, potatoes or bread.   Choose brown options when available and portion control.  Limit fast foods and fried foods.   Choose complex carbs in moderation (ex: green, leafy vegetables, beans, oatmeal).  Eat plenty of fresh fruits and vegetables with lean meats daily.   Consider permanent healthy lifestyle changes.

## 2024-09-23 NOTE — ASSESSMENT & PLAN NOTE
Wellness labs - 9/18/2024.   Cervical Cancer Screening - Last Pap on 2021 at [facility]. Follow up with GYN Clinic for annual Pap/Pelvic.  Breast Cancer Screening -Deferred due to age.   Colon Cancer Screening - Deferred due to age.   Osteoporosis Screening -Deferred due to age.   Vaccinations: Flu - Not currently being offered, recommended annually. / Tetanus - 5/20/2021 /COVID - 7/7/2021 ([facility]).

## 2024-09-24 ENCOUNTER — TELEPHONE (OUTPATIENT)
Dept: INTERNAL MEDICINE | Facility: CLINIC | Age: 34
End: 2024-09-24
Payer: COMMERCIAL

## 2024-09-24 DIAGNOSIS — G43.909 MIGRAINE WITHOUT STATUS MIGRAINOSUS, NOT INTRACTABLE, UNSPECIFIED MIGRAINE TYPE: ICD-10-CM

## 2024-09-24 DIAGNOSIS — D50.0 IRON DEFICIENCY ANEMIA DUE TO CHRONIC BLOOD LOSS: ICD-10-CM

## 2024-09-24 DIAGNOSIS — B37.31 VAGINAL YEAST INFECTION: ICD-10-CM

## 2024-09-24 DIAGNOSIS — N92.0 MENORRHAGIA WITH REGULAR CYCLE: Primary | ICD-10-CM

## 2024-09-24 RX ORDER — FLUCONAZOLE 150 MG/1
150 TABLET ORAL DAILY
Qty: 1 TABLET | Refills: 0 | Status: SHIPPED | OUTPATIENT
Start: 2024-09-24 | End: 2024-09-26

## 2024-09-24 RX ORDER — EPINEPHRINE 0.3 MG/.3ML
0.3 INJECTION SUBCUTANEOUS ONCE AS NEEDED
OUTPATIENT
Start: 2024-09-24

## 2024-09-24 RX ORDER — SUMATRIPTAN SUCCINATE 100 MG/1
TABLET ORAL
Qty: 45 TABLET | Refills: 5 | Status: SHIPPED | OUTPATIENT
Start: 2024-09-24 | End: 2024-09-26

## 2024-09-24 RX ORDER — DIPHENHYDRAMINE HYDROCHLORIDE 50 MG/ML
50 INJECTION INTRAMUSCULAR; INTRAVENOUS ONCE AS NEEDED
OUTPATIENT
Start: 2024-09-24

## 2024-09-24 RX ORDER — HEPARIN 100 UNIT/ML
500 SYRINGE INTRAVENOUS
OUTPATIENT
Start: 2024-09-24

## 2024-09-24 RX ORDER — SODIUM CHLORIDE 0.9 % (FLUSH) 0.9 %
10 SYRINGE (ML) INJECTION
OUTPATIENT
Start: 2024-09-24

## 2024-09-24 NOTE — PROGRESS NOTES
[facility] Neurology Initial Office Visit Note    Initial Visit Date: 9/26/2024  Current Visit Date:  09/26/2024    Chief Complaint:     Chief Complaint   Patient presents with    Migraine     Patient states she has been feeling a lot of throbbing.        History of Present Illness:      This is 34 y.o. female with history of acne, iron deficiency anemia, constipation, obesity, who is referred headache disorder.     Age of Onset : teenage     Headache Description: left temporal, pounding, severe, impeding day to day activity, lasting up to 3 days, with nausea, with photophobia and phonophobia, a/w OS vision scotoma and monocular vision loss      Frequency: 1 to 5 migraine headache days per month.      Provocation Factors: not eating      Risk Factors  - Family history of headache disorder: Yes dad with headache and aneurysm.  - History of focal CNS lesions: No  - History of CNS infections: No  - History head trauma: No  - History of underlying mood disorder: No  - History of sleep disorder: No  - Recreational drug use: No  - Tobacco use: No  - Alcohol use: Yes occasional   - Weight fluctuation: Yes obese. Stable.   - Isotretinoin or Tetracycline use:  Yes tretinoin cream, clindamycin gel.  - Family planning and contraceptive use: Yes actively trying to conceive.    Medications:     Current Prophylactic  Denied     Current Abortive  Sumatriptan 100 mg twice a day as needed (1/18/2023 to present): partially effective.   Ibuprofen PRN: partially effective     Prior Prophylactic  Denied      Prior Abortive  Denied       Devices:     - VNS:  - TNS  - TMS:     Procedures:     - Botox:  - PSG block:   - Occipital nerve block:     Labs:     Results for orders placed or performed in visit on 09/18/24   Chlamydia/GC, PCR    Specimen: Urine   Result Value Ref Range    Chlamydia trachomatis PCR Not Detected Not Detected    N. gonorrhea PCR Not Detected Not Detected    Source Urine    Vitamin D   Result Value Ref Range    Vitamin D 23  (L) 30 - 80 ng/mL   TSH   Result Value Ref Range    TSH 2.087 0.350 - 4.940 uIU/mL   T4, Free   Result Value Ref Range    Thyroxine Free 0.91 0.70 - 1.48 ng/dL   Lipid Panel   Result Value Ref Range    Cholesterol Total 152 <=200 mg/dL    HDL Cholesterol 56 35 - 60 mg/dL    Triglyceride 57 37 - 140 mg/dL    Cholesterol/HDL Ratio 3 0 - 5    Very Low Density Lipoprotein 11     LDL Cholesterol 85.00 50.00 - 140.00 mg/dL   Iron and TIBC   Result Value Ref Range    Iron Binding Capacity Unsaturated 378 (H) 70 - 310 ug/dL    Iron Level 18 (L) 50 - 170 ug/dL    Transferrin 381 180 - 382 mg/dL    Iron Binding Capacity Total 396 250 - 450 ug/dL    Iron Saturation 5 (L) 20 - 50 %   Hemoglobin A1C   Result Value Ref Range    Hemoglobin A1c 5.7 <=7.0 %    Estimated Average Glucose 116.9 mg/dL   Ferritin   Result Value Ref Range    Ferritin Level 5.63 4.63 - 204.00 ng/mL   Comprehensive Metabolic Panel   Result Value Ref Range    Sodium 140 136 - 145 mmol/L    Potassium 4.1 3.5 - 5.1 mmol/L    Chloride 112 (H) 98 - 107 mmol/L    CO2 23 22 - 29 mmol/L    Glucose 102 (H) 74 - 100 mg/dL    Blood Urea Nitrogen 8.0 7.0 - 18.7 mg/dL    Creatinine 0.86 0.55 - 1.02 mg/dL    Calcium 9.1 8.4 - 10.2 mg/dL    Protein Total 7.9 6.4 - 8.3 gm/dL    Albumin 3.7 3.5 - 5.0 g/dL    Globulin 4.2 (H) 2.4 - 3.5 gm/dL    Albumin/Globulin Ratio 0.9 (L) 1.1 - 2.0 ratio    Bilirubin Total 0.3 <=1.5 mg/dL    ALP 62 40 - 150 unit/L    ALT 16 0 - 55 unit/L    AST 20 5 - 34 unit/L    eGFR >60 mL/min/1.73/m2    Anion Gap 5.0 mEq/L    BUN/Creatinine Ratio 9    CBC with Differential   Result Value Ref Range    WBC 4.56 4.50 - 11.50 x10(3)/mcL    RBC 5.09 4.20 - 5.40 x10(6)/mcL    Hgb 9.0 (L) 12.0 - 16.0 g/dL    Hct 31.0 (L) 37.0 - 47.0 %    MCV 60.9 (L) 80.0 - 94.0 fL    MCH 17.7 (L) 27.0 - 31.0 pg    MCHC 29.0 (L) 33.0 - 36.0 g/dL    RDW 19.5 (H) 11.5 - 17.0 %    Platelet 449 (H) 130 - 400 x10(3)/mcL    MPV 9.1 7.4 - 10.4 fL    Neut % 49.6 %    Lymph % 40.1 %     Mono % 8.1 %    Eos % 0.9 %    Basophil % 1.1 %    Lymph # 1.83 0.6 - 4.6 x10(3)/mcL    Neut # 2.26 2.1 - 9.2 x10(3)/mcL    Mono # 0.37 0.1 - 1.3 x10(3)/mcL    Eos # 0.04 0 - 0.9 x10(3)/mcL    Baso # 0.05 <=0.2 x10(3)/mcL    IG# 0.01 0 - 0.04 x10(3)/mcL    IG% 0.2 %    NRBC% 0.0 %   Microalbumin/Creatinine Ratio, Urine   Result Value Ref Range    Urine Microalbumin 9.7 <=30.0 ug/mL    Urine Creatinine 234.1 (H) 45.0 - 106.0 mg/dL    Microalbumin Creatinine Ratio 4.1 0.0 - 30.0 mg/gm Cr   Urinalysis, Reflex to Urine Culture    Specimen: Urine   Result Value Ref Range    Color, UA Light-Yellow Yellow, Light-Yellow, Dark Yellow, Tamika, Straw    Appearance, UA Clear Clear    Specific Gravity, UA 1.027 1.005 - 1.030    pH, UA 6.0 5.0 - 8.5    Protein, UA Trace (A) Negative    Glucose, UA Normal Negative, Normal    Ketones, UA Negative Negative    Blood, UA Negative Negative    Bilirubin, UA Negative Negative    Urobilinogen, UA Normal 0.2, 1.0, Normal    Nitrites, UA Negative Negative    Leukocyte Esterase, UA Negative Negative    RBC, UA 0-5 None Seen, 0-2, 3-5, 0-5 /HPF    WBC, UA 0-5 None Seen, 0-2, 3-5, 0-5 /HPF    Bacteria, UA Trace (A) None Seen /HPF    Squamous Epithelial Cells, UA Moderate (A) None Seen /HPF    Mucous, UA Occ (A) None Seen /LPF    Hyaline Casts, UA None Seen None Seen /lpf       Studies:     - MRI Brain:   - MRA Head w/o Caio:   - MRV Head w/o Caio:   - NCHCT 1/22/2023: I have reviewed the study independently and with the patient.  Unremarkable.   - Lumbar Puncture:    Review of Systems:     Review of Systems   All other systems reviewed and are negative.      Physical Exams:     Vitals:    09/26/24 1521   BP: 118/74   Pulse: 85   Resp: 18   Temp: 97.8 °F (36.6 °C)       Physical Exam  Vitals and nursing note reviewed.   Constitutional:       Appearance: Normal appearance.   HENT:      Head: Normocephalic and atraumatic.      Nose: Nose normal.      Mouth/Throat:      Mouth: Mucous membranes are  moist.      Pharynx: Oropharynx is clear.   Eyes:      Conjunctiva/sclera: Conjunctivae normal.   Cardiovascular:      Rate and Rhythm: Normal rate and regular rhythm.      Pulses: Normal pulses.   Pulmonary:      Effort: Pulmonary effort is normal.      Breath sounds: Normal breath sounds.   Abdominal:      General: Abdomen is flat.   Musculoskeletal:         General: Normal range of motion.      Cervical back: Normal range of motion.   Skin:     General: Skin is warm.   Neurological:      Mental Status: She is alert.         Comprehensive Neurological Exam:  Mental Status: Alert Oriented to Self, Date, and Place. Comprehension wnl. No dysarthria.   CN II - XII: DORIS, No APD, Fundus wnl OU, VFFC, No ptosis OU, EOMI without nystagmus LT/Temp symmetric in CN V1-3 distribution, Hearing grossly intact, Face Symmetric, Tongue and Uvula midline, Trapezius symmetric bilateral.   Motor: tone and bulk wnl throughout, no abnormal involuntary or voluntary movements, 5/5 to confrontation, Fine finger movements wnl b/l, No pronator drift.   Sensory: LT, Proprioception, Vibration, PP, Temp symmetric.   Reflexes: 2+ throughout  Cerebellar: FNF wnl b/l, RAHM wnl b/l  Romberg: Negative  Gait: normal.       Assessment:     This is 34 y.o. female with history of acne, iron deficiency anemia, constipation, obesity, who is referred for migraine headache with retinal aura.    Problem List Items Addressed This Visit    None  Visit Diagnoses       Retinal migraine    -  Primary    Relevant Medications    ubrogepant (UBRELVY) 100 mg tablet    Migraine with aura and with status migrainosus, not intractable        Relevant Medications    ubrogepant (UBRELVY) 100 mg tablet            Plan:     [] stop Sumatriptan 100 mg twice a day as needed   [] start Ubrogepant 100 mg twice a day as needed: triptans are contraindicated in patietn with retinal aura due to increased risk of ischemic optic neuropathy.     RTC 4 Months via Telemed    Headache  education provided: good sleep hygiene and 7 hours of sleep per night, stress management, medication overuse education provided. Using more 3 OTC per week may worsen headaches, high intensity interval training has shown to reduce headache frequency. Low carb, high protein has shown to reduce headache frequency. Patient is instructed in keep headache diary.     I have explained the treatment plan, diagnosis, and prognosis to patient. All questions are answered to the best of my knowledge.     Visit today is associated with current or anticipated ongoing medical care related to this patient's single serious condition/complex condition as documented above.     Face to face time 45 minutes, including documentation, chart review, counseling, education, review of test results, relevant medical records, and coordination of care.       Adali Bay MD   General Neurology  09/26/2024

## 2024-09-24 NOTE — TELEPHONE ENCOUNTER
Spoke with patient in regard to labs.    Discussed in detail patient's anemia. She was offered to oral or infusion iron. Patient has opted for IV iron infusion. She also reports heavy menstrual cycles. States she has to change pads every 2 hours for the first 3-4 days of cycle. Previously had a vaginal US in 2021 - see below for interpretation. She states she was never informed of results.      10/21/2021-FINDINGS:  The uterus measures 6.4 x 4.2 x 3.8 cm. Left ovary measures 3.6 x 3.8 x 2.6 cm. Right ovary measures 3.4 x 2.5 x 2.2 cm. There is no ovarian torsion. Endometrial canal measures 8.4 mm. A small amount of fluid or blood is seen within the endometrium. Patient is currently experiencing menses.     3/4/2021-FINDINGS: Transvaginal images of the pelvis are provided. Real-time grayscale, color and spectral Doppler waveform was performed of the ovaries.     Uterus:   Size: 6.8 x 4.3 x 3.8 cm (58 cc).   The uterus demonstrates a normal sonographic appearance.   Endometrium:  16 mm in thickness. Normal.     Normal color and spectral waveform Doppler. Small bilateral hydrosalpinx.     Right Ovary:   Size: 4.6 x 4.3 x 2.4 cm (24 cc).   2 cm dominant ovarian follicle. Additional follicles noted.     Left Ovary:   Size: 3.4 x 2.7 x 2.5 cm (11.8 cc).   Follicles present.     Cul de Sac: No significant fluid collection.

## 2024-09-26 ENCOUNTER — OFFICE VISIT (OUTPATIENT)
Dept: NEUROLOGY | Facility: CLINIC | Age: 34
End: 2024-09-26
Payer: COMMERCIAL

## 2024-09-26 VITALS
DIASTOLIC BLOOD PRESSURE: 74 MMHG | RESPIRATION RATE: 18 BRPM | OXYGEN SATURATION: 100 % | BODY MASS INDEX: 30.73 KG/M2 | SYSTOLIC BLOOD PRESSURE: 118 MMHG | HEART RATE: 85 BPM | WEIGHT: 180 LBS | HEIGHT: 64 IN | TEMPERATURE: 98 F

## 2024-09-26 DIAGNOSIS — G43.109 RETINAL MIGRAINE: ICD-10-CM

## 2024-09-26 DIAGNOSIS — G43.101 MIGRAINE WITH AURA AND WITH STATUS MIGRAINOSUS, NOT INTRACTABLE: Primary | ICD-10-CM

## 2024-09-26 PROCEDURE — 3074F SYST BP LT 130 MM HG: CPT | Mod: CPTII,,, | Performed by: PSYCHIATRY & NEUROLOGY

## 2024-09-26 PROCEDURE — G2211 COMPLEX E/M VISIT ADD ON: HCPCS | Mod: S$PBB,,, | Performed by: PSYCHIATRY & NEUROLOGY

## 2024-09-26 PROCEDURE — 99204 OFFICE O/P NEW MOD 45 MIN: CPT | Mod: S$PBB,,, | Performed by: PSYCHIATRY & NEUROLOGY

## 2024-09-26 PROCEDURE — 99213 OFFICE O/P EST LOW 20 MIN: CPT | Mod: PBBFAC | Performed by: PSYCHIATRY & NEUROLOGY

## 2024-09-26 PROCEDURE — 3044F HG A1C LEVEL LT 7.0%: CPT | Mod: CPTII,,, | Performed by: PSYCHIATRY & NEUROLOGY

## 2024-09-26 PROCEDURE — 3078F DIAST BP <80 MM HG: CPT | Mod: CPTII,,, | Performed by: PSYCHIATRY & NEUROLOGY

## 2024-09-26 PROCEDURE — 3008F BODY MASS INDEX DOCD: CPT | Mod: CPTII,,, | Performed by: PSYCHIATRY & NEUROLOGY

## 2024-09-26 PROCEDURE — 1159F MED LIST DOCD IN RCRD: CPT | Mod: CPTII,,, | Performed by: PSYCHIATRY & NEUROLOGY

## 2024-09-26 RX ORDER — UBROGEPANT 100 MG/1
100 TABLET ORAL
Qty: 10 TABLET | Refills: 4 | Status: SHIPPED | OUTPATIENT
Start: 2024-09-26 | End: 2024-10-26

## 2024-10-09 ENCOUNTER — INFUSION (OUTPATIENT)
Dept: INFUSION THERAPY | Facility: HOSPITAL | Age: 34
End: 2024-10-09
Attending: INTERNAL MEDICINE
Payer: COMMERCIAL

## 2024-10-09 VITALS
HEART RATE: 90 BPM | TEMPERATURE: 98 F | DIASTOLIC BLOOD PRESSURE: 72 MMHG | OXYGEN SATURATION: 100 % | BODY MASS INDEX: 30.48 KG/M2 | SYSTOLIC BLOOD PRESSURE: 102 MMHG | HEIGHT: 64 IN | WEIGHT: 178.56 LBS

## 2024-10-09 DIAGNOSIS — D50.8 IRON DEFICIENCY ANEMIA SECONDARY TO INADEQUATE DIETARY IRON INTAKE: Primary | ICD-10-CM

## 2024-10-09 PROCEDURE — 96365 THER/PROPH/DIAG IV INF INIT: CPT

## 2024-10-09 PROCEDURE — 63600175 PHARM REV CODE 636 W HCPCS

## 2024-10-09 PROCEDURE — 25000003 PHARM REV CODE 250

## 2024-10-09 RX ORDER — SODIUM CHLORIDE 0.9 % (FLUSH) 0.9 %
10 SYRINGE (ML) INJECTION
OUTPATIENT
Start: 2024-10-16

## 2024-10-09 RX ORDER — DIPHENHYDRAMINE HYDROCHLORIDE 50 MG/ML
50 INJECTION INTRAMUSCULAR; INTRAVENOUS ONCE AS NEEDED
OUTPATIENT
Start: 2024-10-16

## 2024-10-09 RX ORDER — DIPHENHYDRAMINE HYDROCHLORIDE 50 MG/ML
50 INJECTION INTRAMUSCULAR; INTRAVENOUS ONCE AS NEEDED
Status: DISCONTINUED | OUTPATIENT
Start: 2024-10-09 | End: 2024-10-09 | Stop reason: HOSPADM

## 2024-10-09 RX ORDER — HEPARIN 100 UNIT/ML
500 SYRINGE INTRAVENOUS
Status: DISCONTINUED | OUTPATIENT
Start: 2024-10-09 | End: 2024-10-09 | Stop reason: HOSPADM

## 2024-10-09 RX ORDER — EPINEPHRINE 0.3 MG/.3ML
0.3 INJECTION SUBCUTANEOUS ONCE AS NEEDED
OUTPATIENT
Start: 2024-10-16

## 2024-10-09 RX ORDER — HEPARIN 100 UNIT/ML
500 SYRINGE INTRAVENOUS
OUTPATIENT
Start: 2024-10-16

## 2024-10-09 RX ORDER — EPINEPHRINE 1 MG/ML
0.3 INJECTION INTRAMUSCULAR; INTRAVENOUS; SUBCUTANEOUS ONCE AS NEEDED
Status: DISCONTINUED | OUTPATIENT
Start: 2024-10-09 | End: 2024-10-09 | Stop reason: HOSPADM

## 2024-10-09 RX ORDER — SODIUM CHLORIDE 0.9 % (FLUSH) 0.9 %
10 SYRINGE (ML) INJECTION
Status: DISCONTINUED | OUTPATIENT
Start: 2024-10-09 | End: 2024-10-09 | Stop reason: HOSPADM

## 2024-10-09 RX ADMIN — SODIUM CHLORIDE: 9 INJECTION, SOLUTION INTRAVENOUS at 08:10

## 2024-10-09 RX ADMIN — SODIUM CHLORIDE 125 MG: 9 INJECTION, SOLUTION INTRAVENOUS at 08:10

## 2024-10-16 ENCOUNTER — INFUSION (OUTPATIENT)
Dept: INFUSION THERAPY | Facility: HOSPITAL | Age: 34
End: 2024-10-16
Attending: INTERNAL MEDICINE
Payer: COMMERCIAL

## 2024-10-16 VITALS
HEIGHT: 64 IN | RESPIRATION RATE: 20 BRPM | SYSTOLIC BLOOD PRESSURE: 128 MMHG | TEMPERATURE: 98 F | HEART RATE: 80 BPM | BODY MASS INDEX: 30.48 KG/M2 | OXYGEN SATURATION: 100 % | WEIGHT: 178.5 LBS | DIASTOLIC BLOOD PRESSURE: 81 MMHG

## 2024-10-16 DIAGNOSIS — D50.8 IRON DEFICIENCY ANEMIA SECONDARY TO INADEQUATE DIETARY IRON INTAKE: Primary | ICD-10-CM

## 2024-10-16 PROCEDURE — 25000003 PHARM REV CODE 250

## 2024-10-16 PROCEDURE — 63600175 PHARM REV CODE 636 W HCPCS

## 2024-10-16 RX ORDER — HEPARIN 100 UNIT/ML
500 SYRINGE INTRAVENOUS
OUTPATIENT
Start: 2024-10-23

## 2024-10-16 RX ORDER — DIPHENHYDRAMINE HYDROCHLORIDE 50 MG/ML
50 INJECTION INTRAMUSCULAR; INTRAVENOUS ONCE AS NEEDED
OUTPATIENT
Start: 2024-10-23

## 2024-10-16 RX ORDER — SODIUM CHLORIDE 0.9 % (FLUSH) 0.9 %
10 SYRINGE (ML) INJECTION
OUTPATIENT
Start: 2024-10-23

## 2024-10-16 RX ORDER — EPINEPHRINE 0.3 MG/.3ML
0.3 INJECTION SUBCUTANEOUS ONCE AS NEEDED
OUTPATIENT
Start: 2024-10-23

## 2024-10-16 RX ORDER — SODIUM CHLORIDE 0.9 % (FLUSH) 0.9 %
10 SYRINGE (ML) INJECTION
Status: DISCONTINUED | OUTPATIENT
Start: 2024-10-16 | End: 2024-10-16 | Stop reason: HOSPADM

## 2024-10-16 RX ADMIN — SODIUM CHLORIDE 125 MG: 9 INJECTION, SOLUTION INTRAVENOUS at 08:10

## 2024-10-16 NOTE — NURSING
"Patient here for ferrlecit. Patient states "I would like a private room." Maine Lozada MA, spoke to patient that today we could not accommodate patient today but will try but not guarantee for next time. Patient verbalizes understanding. Patient place in a double occupancy room.   Patient tolerated well.   "

## 2024-10-23 ENCOUNTER — INFUSION (OUTPATIENT)
Dept: INFUSION THERAPY | Facility: HOSPITAL | Age: 34
End: 2024-10-23
Attending: INTERNAL MEDICINE
Payer: COMMERCIAL

## 2024-10-23 VITALS
BODY MASS INDEX: 30.16 KG/M2 | WEIGHT: 176.63 LBS | HEART RATE: 85 BPM | TEMPERATURE: 98 F | OXYGEN SATURATION: 100 % | HEIGHT: 64 IN | RESPIRATION RATE: 20 BRPM | DIASTOLIC BLOOD PRESSURE: 66 MMHG | SYSTOLIC BLOOD PRESSURE: 114 MMHG

## 2024-10-23 DIAGNOSIS — D50.8 IRON DEFICIENCY ANEMIA SECONDARY TO INADEQUATE DIETARY IRON INTAKE: Primary | ICD-10-CM

## 2024-10-23 PROCEDURE — 96365 THER/PROPH/DIAG IV INF INIT: CPT

## 2024-10-23 PROCEDURE — 63600175 PHARM REV CODE 636 W HCPCS

## 2024-10-23 PROCEDURE — 25000003 PHARM REV CODE 250

## 2024-10-23 RX ORDER — SODIUM CHLORIDE 0.9 % (FLUSH) 0.9 %
10 SYRINGE (ML) INJECTION
Status: DISCONTINUED | OUTPATIENT
Start: 2024-10-23 | End: 2024-10-23 | Stop reason: HOSPADM

## 2024-10-23 RX ORDER — SODIUM CHLORIDE 0.9 % (FLUSH) 0.9 %
10 SYRINGE (ML) INJECTION
OUTPATIENT
Start: 2024-10-30

## 2024-10-23 RX ORDER — DIPHENHYDRAMINE HYDROCHLORIDE 50 MG/ML
50 INJECTION INTRAMUSCULAR; INTRAVENOUS ONCE AS NEEDED
OUTPATIENT
Start: 2024-10-30

## 2024-10-23 RX ORDER — EPINEPHRINE 0.3 MG/.3ML
0.3 INJECTION SUBCUTANEOUS ONCE AS NEEDED
OUTPATIENT
Start: 2024-10-30

## 2024-10-23 RX ORDER — DIPHENHYDRAMINE HYDROCHLORIDE 50 MG/ML
50 INJECTION INTRAMUSCULAR; INTRAVENOUS ONCE AS NEEDED
Status: DISCONTINUED | OUTPATIENT
Start: 2024-10-23 | End: 2024-10-23 | Stop reason: HOSPADM

## 2024-10-23 RX ORDER — HEPARIN 100 UNIT/ML
500 SYRINGE INTRAVENOUS
OUTPATIENT
Start: 2024-10-30

## 2024-10-23 RX ADMIN — SODIUM CHLORIDE 125 MG: 9 INJECTION, SOLUTION INTRAVENOUS at 08:10

## 2024-10-23 NOTE — NURSING
Patient arrived ambulatory to infusion alert and oriented with no complaints at this time. Pt is here for Ferrlecit #3/8.  Pt tolerated tx well.    Geovanna Wing RN

## 2024-11-01 ENCOUNTER — DOCUMENTATION ONLY (OUTPATIENT)
Dept: INFUSION THERAPY | Facility: HOSPITAL | Age: 34
End: 2024-11-01
Payer: COMMERCIAL

## 2024-11-06 ENCOUNTER — INFUSION (OUTPATIENT)
Dept: INFUSION THERAPY | Facility: HOSPITAL | Age: 34
End: 2024-11-06
Attending: INTERNAL MEDICINE
Payer: COMMERCIAL

## 2024-11-06 VITALS
OXYGEN SATURATION: 99 % | DIASTOLIC BLOOD PRESSURE: 71 MMHG | WEIGHT: 177.69 LBS | SYSTOLIC BLOOD PRESSURE: 99 MMHG | RESPIRATION RATE: 20 BRPM | HEART RATE: 73 BPM | BODY MASS INDEX: 30.34 KG/M2 | TEMPERATURE: 98 F | HEIGHT: 64 IN

## 2024-11-06 DIAGNOSIS — D50.8 IRON DEFICIENCY ANEMIA SECONDARY TO INADEQUATE DIETARY IRON INTAKE: Primary | ICD-10-CM

## 2024-11-06 PROCEDURE — 25000003 PHARM REV CODE 250

## 2024-11-06 PROCEDURE — 63600175 PHARM REV CODE 636 W HCPCS

## 2024-11-06 PROCEDURE — 96365 THER/PROPH/DIAG IV INF INIT: CPT

## 2024-11-06 RX ORDER — SODIUM CHLORIDE 0.9 % (FLUSH) 0.9 %
10 SYRINGE (ML) INJECTION
Status: DISCONTINUED | OUTPATIENT
Start: 2024-11-06 | End: 2024-11-06 | Stop reason: HOSPADM

## 2024-11-06 RX ORDER — DIPHENHYDRAMINE HYDROCHLORIDE 50 MG/ML
50 INJECTION INTRAMUSCULAR; INTRAVENOUS ONCE AS NEEDED
OUTPATIENT
Start: 2024-11-13

## 2024-11-06 RX ORDER — DIPHENHYDRAMINE HYDROCHLORIDE 50 MG/ML
50 INJECTION INTRAMUSCULAR; INTRAVENOUS ONCE AS NEEDED
Status: DISCONTINUED | OUTPATIENT
Start: 2024-11-06 | End: 2024-11-06 | Stop reason: HOSPADM

## 2024-11-06 RX ORDER — SODIUM CHLORIDE 0.9 % (FLUSH) 0.9 %
10 SYRINGE (ML) INJECTION
OUTPATIENT
Start: 2024-11-13

## 2024-11-06 RX ORDER — HEPARIN 100 UNIT/ML
500 SYRINGE INTRAVENOUS
Status: DISCONTINUED | OUTPATIENT
Start: 2024-11-06 | End: 2024-11-06 | Stop reason: HOSPADM

## 2024-11-06 RX ORDER — EPINEPHRINE 0.3 MG/.3ML
0.3 INJECTION SUBCUTANEOUS ONCE AS NEEDED
Status: DISCONTINUED | OUTPATIENT
Start: 2024-11-06 | End: 2024-11-06 | Stop reason: HOSPADM

## 2024-11-06 RX ORDER — EPINEPHRINE 0.3 MG/.3ML
0.3 INJECTION SUBCUTANEOUS ONCE AS NEEDED
OUTPATIENT
Start: 2024-11-13

## 2024-11-06 RX ORDER — HEPARIN 100 UNIT/ML
500 SYRINGE INTRAVENOUS
OUTPATIENT
Start: 2024-11-13

## 2024-11-06 RX ADMIN — SODIUM CHLORIDE 125 MG: 9 INJECTION, SOLUTION INTRAVENOUS at 07:11

## 2024-11-11 ENCOUNTER — INFUSION (OUTPATIENT)
Dept: INFUSION THERAPY | Facility: HOSPITAL | Age: 34
End: 2024-11-11
Attending: INTERNAL MEDICINE
Payer: COMMERCIAL

## 2024-11-11 VITALS
RESPIRATION RATE: 20 BRPM | SYSTOLIC BLOOD PRESSURE: 110 MMHG | TEMPERATURE: 98 F | BODY MASS INDEX: 30.67 KG/M2 | HEART RATE: 82 BPM | DIASTOLIC BLOOD PRESSURE: 65 MMHG | HEIGHT: 64 IN | OXYGEN SATURATION: 100 % | WEIGHT: 179.63 LBS

## 2024-11-11 DIAGNOSIS — D50.8 IRON DEFICIENCY ANEMIA SECONDARY TO INADEQUATE DIETARY IRON INTAKE: Primary | ICD-10-CM

## 2024-11-11 PROCEDURE — 96365 THER/PROPH/DIAG IV INF INIT: CPT

## 2024-11-11 PROCEDURE — A4216 STERILE WATER/SALINE, 10 ML: HCPCS

## 2024-11-11 PROCEDURE — 63600175 PHARM REV CODE 636 W HCPCS

## 2024-11-11 PROCEDURE — 25000003 PHARM REV CODE 250

## 2024-11-11 RX ORDER — SODIUM CHLORIDE 0.9 % (FLUSH) 0.9 %
10 SYRINGE (ML) INJECTION
OUTPATIENT
Start: 2024-11-13

## 2024-11-11 RX ORDER — EPINEPHRINE 0.3 MG/.3ML
0.3 INJECTION SUBCUTANEOUS ONCE AS NEEDED
OUTPATIENT
Start: 2024-11-13

## 2024-11-11 RX ORDER — SODIUM CHLORIDE 0.9 % (FLUSH) 0.9 %
10 SYRINGE (ML) INJECTION
Status: DISCONTINUED | OUTPATIENT
Start: 2024-11-11 | End: 2024-11-11 | Stop reason: HOSPADM

## 2024-11-11 RX ORDER — DIPHENHYDRAMINE HYDROCHLORIDE 50 MG/ML
50 INJECTION INTRAMUSCULAR; INTRAVENOUS ONCE AS NEEDED
OUTPATIENT
Start: 2024-11-13

## 2024-11-11 RX ORDER — HEPARIN 100 UNIT/ML
500 SYRINGE INTRAVENOUS
OUTPATIENT
Start: 2024-11-13

## 2024-11-11 RX ADMIN — Medication 10 ML: at 09:11

## 2024-11-11 RX ADMIN — SODIUM CHLORIDE 125 MG: 9 INJECTION, SOLUTION INTRAVENOUS at 08:11

## 2024-11-11 NOTE — NURSING
0800 Patient is here for Ferrlecit #5. She voices no c/o.   0945 Ferrlecit given via r ac piv w/o incident.

## 2024-11-20 ENCOUNTER — INFUSION (OUTPATIENT)
Dept: INFUSION THERAPY | Facility: HOSPITAL | Age: 34
End: 2024-11-20
Attending: INTERNAL MEDICINE
Payer: COMMERCIAL

## 2024-11-20 VITALS
TEMPERATURE: 98 F | HEIGHT: 64 IN | SYSTOLIC BLOOD PRESSURE: 100 MMHG | BODY MASS INDEX: 30.22 KG/M2 | OXYGEN SATURATION: 100 % | DIASTOLIC BLOOD PRESSURE: 65 MMHG | WEIGHT: 177 LBS | HEART RATE: 79 BPM | RESPIRATION RATE: 20 BRPM

## 2024-11-20 DIAGNOSIS — D50.8 IRON DEFICIENCY ANEMIA SECONDARY TO INADEQUATE DIETARY IRON INTAKE: Primary | ICD-10-CM

## 2024-11-20 PROCEDURE — 63600175 PHARM REV CODE 636 W HCPCS

## 2024-11-20 PROCEDURE — 96365 THER/PROPH/DIAG IV INF INIT: CPT

## 2024-11-20 PROCEDURE — 25000003 PHARM REV CODE 250

## 2024-11-20 RX ORDER — EPINEPHRINE 0.3 MG/.3ML
0.3 INJECTION SUBCUTANEOUS ONCE AS NEEDED
OUTPATIENT
Start: 2024-11-27

## 2024-11-20 RX ORDER — DIPHENHYDRAMINE HYDROCHLORIDE 50 MG/ML
50 INJECTION INTRAMUSCULAR; INTRAVENOUS ONCE AS NEEDED
Status: DISCONTINUED | OUTPATIENT
Start: 2024-11-20 | End: 2024-11-20 | Stop reason: HOSPADM

## 2024-11-20 RX ORDER — SODIUM CHLORIDE 0.9 % (FLUSH) 0.9 %
10 SYRINGE (ML) INJECTION
OUTPATIENT
Start: 2024-11-27

## 2024-11-20 RX ORDER — DIPHENHYDRAMINE HYDROCHLORIDE 50 MG/ML
50 INJECTION INTRAMUSCULAR; INTRAVENOUS ONCE AS NEEDED
OUTPATIENT
Start: 2024-11-27

## 2024-11-20 RX ORDER — HEPARIN 100 UNIT/ML
500 SYRINGE INTRAVENOUS
OUTPATIENT
Start: 2024-11-27

## 2024-11-20 RX ORDER — EPINEPHRINE 1 MG/ML
0.3 INJECTION INTRAMUSCULAR; INTRAVENOUS; SUBCUTANEOUS ONCE AS NEEDED
Status: DISCONTINUED | OUTPATIENT
Start: 2024-11-20 | End: 2024-11-20 | Stop reason: HOSPADM

## 2024-11-20 RX ORDER — SODIUM CHLORIDE 0.9 % (FLUSH) 0.9 %
10 SYRINGE (ML) INJECTION
Status: DISCONTINUED | OUTPATIENT
Start: 2024-11-20 | End: 2024-11-20 | Stop reason: HOSPADM

## 2024-11-20 RX ORDER — HEPARIN 100 UNIT/ML
500 SYRINGE INTRAVENOUS
Status: DISCONTINUED | OUTPATIENT
Start: 2024-11-20 | End: 2024-11-20 | Stop reason: HOSPADM

## 2024-11-20 RX ADMIN — SODIUM CHLORIDE 125 MG: 9 INJECTION, SOLUTION INTRAVENOUS at 08:11

## 2024-11-26 ENCOUNTER — DOCUMENTATION ONLY (OUTPATIENT)
Dept: INFUSION THERAPY | Facility: HOSPITAL | Age: 34
End: 2024-11-26
Payer: COMMERCIAL

## 2024-11-26 NOTE — PROGRESS NOTES
Called pt to confirm tomorrow's iron infusion. She states she will R/S due to personal business. Notified pt her next appt is 12/4 @ 0800 and a new appt will be made for the following week. Pt verbalized understanding.

## 2024-12-04 ENCOUNTER — INFUSION (OUTPATIENT)
Dept: INFUSION THERAPY | Facility: HOSPITAL | Age: 34
End: 2024-12-04
Attending: INTERNAL MEDICINE
Payer: COMMERCIAL

## 2024-12-04 VITALS
HEART RATE: 77 BPM | OXYGEN SATURATION: 100 % | SYSTOLIC BLOOD PRESSURE: 100 MMHG | WEIGHT: 181 LBS | TEMPERATURE: 98 F | RESPIRATION RATE: 20 BRPM | HEIGHT: 64 IN | BODY MASS INDEX: 30.9 KG/M2 | DIASTOLIC BLOOD PRESSURE: 71 MMHG

## 2024-12-04 DIAGNOSIS — D50.8 IRON DEFICIENCY ANEMIA SECONDARY TO INADEQUATE DIETARY IRON INTAKE: Primary | ICD-10-CM

## 2024-12-04 PROCEDURE — 25000003 PHARM REV CODE 250

## 2024-12-04 PROCEDURE — 96365 THER/PROPH/DIAG IV INF INIT: CPT

## 2024-12-04 PROCEDURE — 63600175 PHARM REV CODE 636 W HCPCS

## 2024-12-04 RX ORDER — EPINEPHRINE 0.3 MG/.3ML
0.3 INJECTION SUBCUTANEOUS ONCE AS NEEDED
OUTPATIENT
Start: 2024-12-11

## 2024-12-04 RX ORDER — HEPARIN 100 UNIT/ML
500 SYRINGE INTRAVENOUS
OUTPATIENT
Start: 2024-12-11

## 2024-12-04 RX ORDER — SODIUM CHLORIDE 0.9 % (FLUSH) 0.9 %
10 SYRINGE (ML) INJECTION
OUTPATIENT
Start: 2024-12-11

## 2024-12-04 RX ORDER — SODIUM CHLORIDE 0.9 % (FLUSH) 0.9 %
10 SYRINGE (ML) INJECTION
Status: DISCONTINUED | OUTPATIENT
Start: 2024-12-04 | End: 2024-12-04 | Stop reason: HOSPADM

## 2024-12-04 RX ORDER — DIPHENHYDRAMINE HYDROCHLORIDE 50 MG/ML
50 INJECTION INTRAMUSCULAR; INTRAVENOUS ONCE AS NEEDED
OUTPATIENT
Start: 2024-12-11

## 2024-12-04 RX ADMIN — SODIUM CHLORIDE 125 MG: 9 INJECTION, SOLUTION INTRAVENOUS at 08:12

## 2024-12-11 ENCOUNTER — INFUSION (OUTPATIENT)
Dept: INFUSION THERAPY | Facility: HOSPITAL | Age: 34
End: 2024-12-11
Attending: INTERNAL MEDICINE
Payer: COMMERCIAL

## 2024-12-11 VITALS
HEIGHT: 64 IN | RESPIRATION RATE: 20 BRPM | HEART RATE: 87 BPM | BODY MASS INDEX: 30.19 KG/M2 | OXYGEN SATURATION: 100 % | SYSTOLIC BLOOD PRESSURE: 112 MMHG | WEIGHT: 176.81 LBS | DIASTOLIC BLOOD PRESSURE: 71 MMHG | TEMPERATURE: 98 F

## 2024-12-11 DIAGNOSIS — D50.8 IRON DEFICIENCY ANEMIA SECONDARY TO INADEQUATE DIETARY IRON INTAKE: Primary | ICD-10-CM

## 2024-12-11 PROCEDURE — 96365 THER/PROPH/DIAG IV INF INIT: CPT

## 2024-12-11 PROCEDURE — 25000003 PHARM REV CODE 250

## 2024-12-11 PROCEDURE — 63600175 PHARM REV CODE 636 W HCPCS

## 2024-12-11 RX ORDER — HEPARIN 100 UNIT/ML
500 SYRINGE INTRAVENOUS
OUTPATIENT
Start: 2024-12-11

## 2024-12-11 RX ORDER — DIPHENHYDRAMINE HYDROCHLORIDE 50 MG/ML
50 INJECTION INTRAMUSCULAR; INTRAVENOUS ONCE AS NEEDED
OUTPATIENT
Start: 2024-12-11

## 2024-12-11 RX ORDER — SODIUM CHLORIDE 0.9 % (FLUSH) 0.9 %
10 SYRINGE (ML) INJECTION
Status: CANCELLED | OUTPATIENT
Start: 2024-12-11

## 2024-12-11 RX ORDER — DIPHENHYDRAMINE HYDROCHLORIDE 50 MG/ML
50 INJECTION INTRAMUSCULAR; INTRAVENOUS ONCE AS NEEDED
Status: DISCONTINUED | OUTPATIENT
Start: 2024-12-11 | End: 2024-12-11 | Stop reason: HOSPADM

## 2024-12-11 RX ORDER — EPINEPHRINE 0.3 MG/.3ML
0.3 INJECTION SUBCUTANEOUS ONCE AS NEEDED
OUTPATIENT
Start: 2024-12-11

## 2024-12-11 RX ORDER — SODIUM CHLORIDE 0.9 % (FLUSH) 0.9 %
10 SYRINGE (ML) INJECTION
Status: DISCONTINUED | OUTPATIENT
Start: 2024-12-11 | End: 2024-12-11 | Stop reason: HOSPADM

## 2024-12-11 RX ADMIN — SODIUM CHLORIDE 125 MG: 9 INJECTION, SOLUTION INTRAVENOUS at 08:12

## 2025-01-10 ENCOUNTER — OFFICE VISIT (OUTPATIENT)
Dept: DERMATOLOGY | Facility: CLINIC | Age: 35
End: 2025-01-10
Payer: COMMERCIAL

## 2025-01-10 VITALS
TEMPERATURE: 98 F | SYSTOLIC BLOOD PRESSURE: 104 MMHG | HEIGHT: 64 IN | BODY MASS INDEX: 29.88 KG/M2 | HEART RATE: 93 BPM | OXYGEN SATURATION: 100 % | DIASTOLIC BLOOD PRESSURE: 67 MMHG | WEIGHT: 175 LBS | RESPIRATION RATE: 20 BRPM

## 2025-01-10 DIAGNOSIS — L70.9 ACNE, UNSPECIFIED ACNE TYPE: ICD-10-CM

## 2025-01-10 DIAGNOSIS — L81.0 POST-INFLAMMATORY HYPERPIGMENTATION: Primary | ICD-10-CM

## 2025-01-10 PROCEDURE — 99213 OFFICE O/P EST LOW 20 MIN: CPT | Mod: PBBFAC | Performed by: DERMATOLOGY

## 2025-01-10 RX ORDER — CLINDAMYCIN AND BENZOYL PEROXIDE 10; 50 MG/G; MG/G
GEL TOPICAL
Qty: 25 G | Refills: 5 | Status: SHIPPED | OUTPATIENT
Start: 2025-01-10

## 2025-01-10 RX ORDER — TRETINOIN 0.5 MG/G
CREAM TOPICAL NIGHTLY
Qty: 45 G | Refills: 0 | Status: SHIPPED | OUTPATIENT
Start: 2025-01-10

## 2025-01-10 NOTE — PROGRESS NOTES
[place] DERMATOLOGY OFFICE VISIT NOTE  Dafne Traore  [id number]  01/10/2025      Chief Complaint: Acne (Follow up, patient requesting medication to minimize her pores and prevent redness)      HPI:    Dafne Traore is a 34 y.o. female  presenting to [facility] acne and hyperpigmentation.     Last seen 6/20/24. At that time, Retin-A increased to 0.05%. Has not been using Benzaclin. Endorsing some improvement but would like further control for her acne as well as reduction in pore size.      ROS:  Gen: denies fever or chills  Cardio: denies chest pain  Resp: denies shortness of breath  Skin: as per HPI    PE:  Vitals:    01/10/25 0812   BP: 104/67   Pulse: 93   Resp: 20   Temp: 97.9 °F (36.6 °C)     Gen: alert, no acute distress  Skin:  Psych: cooperative, appropriate mood and affect    Assessment:   1. Post-inflammatory hyperpigmentation    2. Acne, unspecified acne type        Plan:  Discussed increase in Retin-A strength, but patient would like to stay on current strength due to dryness  Discussed initiation of Aldactone for both acne and hirsutism (hirsutism discussed post visit); given patient is currently actively attempting conception with her , advised to hold off on starting med for time being  Educated on stopping Retin-A topical at time of pregnancy  C/w tretinoin cream 0.05% nightly.  Changed Benzaclin PRN pustular break outs to qAM and PRN spot treatment.  Refilled both meds today.  C/w daily use of tinted Sunscreen: use specifically: 30 spf or higher, broad spectrum, and tinted.   Sun avoidance instructions given.       Return to clinic in 6 months for acne vulgaris and post-inflammatory hyperpigmentation.        Luan Burt MD  Internal Medicine - PGY-3  [facility]